# Patient Record
Sex: MALE | Race: WHITE | NOT HISPANIC OR LATINO | Employment: OTHER | URBAN - METROPOLITAN AREA
[De-identification: names, ages, dates, MRNs, and addresses within clinical notes are randomized per-mention and may not be internally consistent; named-entity substitution may affect disease eponyms.]

---

## 2018-09-20 ENCOUNTER — CONVERSION ENCOUNTER (OUTPATIENT)
Dept: SURGERY | Facility: CLINIC | Age: 68
End: 2018-09-20

## 2018-09-20 LAB
ALBUMIN SERPL-MCNC: 3.8 G/DL (ref 3.6–5.1)
ALBUMIN/GLOB SERPL: ABNORMAL {RATIO} (ref 1–2.1)
ALP SERPL-CCNC: 62 UNITS/L (ref 40–115)
ALT SERPL-CCNC: 13 UNITS/L (ref 9–60)
AST SERPL-CCNC: 20 UNITS/L (ref 10–35)
BILIRUB SERPL-MCNC: 1 MG/DL (ref 0.2–1.2)
BUN SERPL-MCNC: 13 MG/DL (ref 7–25)
BUN/CREAT SERPL: ABNORMAL (ref 6–22)
CALCIUM SERPL-MCNC: 8.9 MG/DL (ref 8.6–10.2)
CHLORIDE SERPL-SCNC: 103 MMOL/L (ref 98–110)
CHOLEST SERPL-MCNC: 186 MG/DL (ref 125–200)
CHOLEST/HDLC SERPL: ABNORMAL {RATIO}
CONV CO2: 28 MMOL/L (ref 21–33)
CONV TOTAL PROTEIN: 6.3 G/DL (ref 6.2–8.3)
CREAT UR-MCNC: 1 MG/DL (ref 0.76–1.46)
GLOBULIN UR ELPH-MCNC: ABNORMAL G/DL (ref 2.1–3.7)
GLUCOSE SERPL-MCNC: 86 MG/DL (ref 65–99)
HDLC SERPL-MCNC: 33 MG/DL
LDLC SERPL CALC-MCNC: 129 MG/DL
POTASSIUM SERPL-SCNC: 4.3 MMOL/L (ref 3.5–5.3)
PSA SERPL-MCNC: 0.3 NG/ML
SODIUM SERPL-SCNC: 139 MMOL/L (ref 135–146)
TRIGL SERPL-MCNC: 119 MG/DL

## 2018-11-05 ENCOUNTER — HOSPITAL ENCOUNTER (OUTPATIENT)
Dept: CARDIOLOGY | Facility: HOSPITAL | Age: 68
Discharge: HOME | End: 2018-11-05
Attending: SURGERY | Admitting: SURGERY

## 2019-08-08 ENCOUNTER — OFFICE VISIT (OUTPATIENT)
Dept: FAMILY MEDICINE CLINIC | Facility: CLINIC | Age: 69
End: 2019-08-08

## 2019-08-08 VITALS
TEMPERATURE: 98.3 F | OXYGEN SATURATION: 96 % | RESPIRATION RATE: 18 BRPM | HEART RATE: 66 BPM | HEIGHT: 69 IN | WEIGHT: 162.6 LBS | DIASTOLIC BLOOD PRESSURE: 68 MMHG | BODY MASS INDEX: 24.08 KG/M2 | SYSTOLIC BLOOD PRESSURE: 112 MMHG

## 2019-08-08 DIAGNOSIS — S43.421A SPRAIN OF RIGHT ROTATOR CUFF CAPSULE, INITIAL ENCOUNTER: ICD-10-CM

## 2019-08-08 DIAGNOSIS — Z04.1 ENCOUNTER FOR EXAMINATION FOLLOWING MOTOR VEHICLE ACCIDENT (MVA): Primary | ICD-10-CM

## 2019-08-08 DIAGNOSIS — T07.XXXA MULTIPLE CONTUSIONS: ICD-10-CM

## 2019-08-08 PROBLEM — Z13.220 ENCOUNTER FOR LIPID SCREENING FOR CARDIOVASCULAR DISEASE: Status: ACTIVE | Noted: 2018-09-13

## 2019-08-08 PROBLEM — I83.11 VARICOSE VEINS OF RIGHT LOWER EXTREMITY WITH INFLAMMATION: Status: ACTIVE | Noted: 2018-09-13

## 2019-08-08 PROBLEM — Z13.6 ENCOUNTER FOR LIPID SCREENING FOR CARDIOVASCULAR DISEASE: Status: ACTIVE | Noted: 2018-09-13

## 2019-08-08 PROBLEM — Z12.5 ENCOUNTER FOR SCREENING FOR MALIGNANT NEOPLASM OF PROSTATE: Status: ACTIVE | Noted: 2018-09-13

## 2019-08-08 PROBLEM — R03.0 ELEVATED BLOOD-PRESSURE READING WITHOUT DIAGNOSIS OF HYPERTENSION: Status: ACTIVE | Noted: 2018-09-13

## 2019-08-08 PROCEDURE — 99213 OFFICE O/P EST LOW 20 MIN: CPT | Performed by: FAMILY MEDICINE
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
## 2019-09-12 ENCOUNTER — OFFICE VISIT (OUTPATIENT)
Dept: FAMILY MEDICINE CLINIC | Facility: CLINIC | Age: 69
End: 2019-09-12

## 2019-09-12 VITALS
BODY MASS INDEX: 23.58 KG/M2 | OXYGEN SATURATION: 95 % | DIASTOLIC BLOOD PRESSURE: 73 MMHG | HEART RATE: 81 BPM | SYSTOLIC BLOOD PRESSURE: 115 MMHG | RESPIRATION RATE: 18 BRPM | TEMPERATURE: 98.4 F | HEIGHT: 69 IN | WEIGHT: 159.2 LBS

## 2019-09-12 DIAGNOSIS — M25.611 DECREASED RIGHT SHOULDER RANGE OF MOTION: ICD-10-CM

## 2019-09-12 DIAGNOSIS — R22.31 ARM MASS, RIGHT: ICD-10-CM

## 2019-09-12 DIAGNOSIS — I49.9 CARDIAC ARRHYTHMIA, UNSPECIFIED CARDIAC ARRHYTHMIA TYPE: Primary | ICD-10-CM

## 2019-09-12 DIAGNOSIS — S29.8XXD BLUNT TRAUMA TO CHEST, SUBSEQUENT ENCOUNTER: ICD-10-CM

## 2019-09-12 DIAGNOSIS — S43.421A SPRAIN OF RIGHT ROTATOR CUFF CAPSULE, INITIAL ENCOUNTER: ICD-10-CM

## 2019-09-12 PROCEDURE — 99213 OFFICE O/P EST LOW 20 MIN: CPT | Performed by: FAMILY MEDICINE

## 2019-09-25 DIAGNOSIS — S43.421A SPRAIN OF RIGHT ROTATOR CUFF CAPSULE, INITIAL ENCOUNTER: ICD-10-CM

## 2019-09-25 DIAGNOSIS — M25.511 CHRONIC RIGHT SHOULDER PAIN: ICD-10-CM

## 2019-09-25 DIAGNOSIS — G89.29 CHRONIC RIGHT SHOULDER PAIN: ICD-10-CM

## 2019-09-25 DIAGNOSIS — M25.611 DECREASED RIGHT SHOULDER RANGE OF MOTION: ICD-10-CM

## 2019-09-30 ENCOUNTER — TELEPHONE (OUTPATIENT)
Dept: FAMILY MEDICINE CLINIC | Facility: CLINIC | Age: 69
End: 2019-09-30

## 2019-09-30 DIAGNOSIS — M75.100 TEAR OF ROTATOR CUFF, UNSPECIFIED LATERALITY, UNSPECIFIED TEAR EXTENT: Primary | ICD-10-CM

## 2019-09-30 NOTE — TELEPHONE ENCOUNTER
----- Message from Amber Stuart DO sent at 9/29/2019  8:20 AM EDT -----  Shoulder joint shows tearing of mult tendons and rec seeing Dr. Elias to see if he can offer any sx tx option

## 2019-10-16 ENCOUNTER — OFFICE VISIT (OUTPATIENT)
Dept: ORTHOPEDIC SURGERY | Facility: CLINIC | Age: 69
End: 2019-10-16

## 2019-10-16 ENCOUNTER — PREP FOR SURGERY (OUTPATIENT)
Dept: OTHER | Facility: HOSPITAL | Age: 69
End: 2019-10-16

## 2019-10-16 VITALS
DIASTOLIC BLOOD PRESSURE: 85 MMHG | SYSTOLIC BLOOD PRESSURE: 127 MMHG | HEART RATE: 83 BPM | HEIGHT: 69 IN | WEIGHT: 160 LBS | BODY MASS INDEX: 23.7 KG/M2

## 2019-10-16 DIAGNOSIS — M75.121 COMPLETE TEAR OF RIGHT ROTATOR CUFF, UNSPECIFIED WHETHER TRAUMATIC: ICD-10-CM

## 2019-10-16 DIAGNOSIS — R79.9 ABNORMAL FINDING OF BLOOD CHEMISTRY, UNSPECIFIED: ICD-10-CM

## 2019-10-16 DIAGNOSIS — M19.011 OSTEOARTHRITIS OF RIGHT GLENOHUMERAL JOINT: Primary | ICD-10-CM

## 2019-10-16 DIAGNOSIS — M25.511 ACUTE PAIN OF RIGHT SHOULDER: Primary | ICD-10-CM

## 2019-10-16 DIAGNOSIS — R79.1 ABNORMAL COAGULATION PROFILE: ICD-10-CM

## 2019-10-16 DIAGNOSIS — M19.011 OSTEOARTHRITIS OF RIGHT GLENOHUMERAL JOINT: ICD-10-CM

## 2019-10-16 PROCEDURE — 99203 OFFICE O/P NEW LOW 30 MIN: CPT | Performed by: ORTHOPAEDIC SURGERY

## 2019-10-16 RX ORDER — MELOXICAM 15 MG/1
15 TABLET ORAL ONCE
Status: CANCELLED | OUTPATIENT
Start: 2019-10-16 | End: 2019-10-16

## 2019-10-16 RX ORDER — PREGABALIN 75 MG/1
150 CAPSULE ORAL ONCE
Status: CANCELLED | OUTPATIENT
Start: 2019-10-16 | End: 2019-10-16

## 2019-10-16 RX ORDER — OXYCODONE HCL 10 MG/1
10 TABLET, FILM COATED, EXTENDED RELEASE ORAL ONCE
Status: CANCELLED | OUTPATIENT
Start: 2019-10-16 | End: 2019-10-16
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
## 2019-10-28 ENCOUNTER — HOSPITAL ENCOUNTER (OUTPATIENT)
Dept: GENERAL RADIOLOGY | Facility: HOSPITAL | Age: 69
Discharge: HOME | End: 2019-10-28
Admitting: ORTHOPAEDIC SURGERY

## 2019-10-28 ENCOUNTER — APPOINTMENT (OUTPATIENT)
Dept: PREADMISSION TESTING | Facility: HOSPITAL | Age: 69
End: 2019-10-28

## 2019-10-28 VITALS
SYSTOLIC BLOOD PRESSURE: 127 MMHG | DIASTOLIC BLOOD PRESSURE: 80 MMHG | HEIGHT: 69 IN | HEART RATE: 77 BPM | OXYGEN SATURATION: 97 % | BODY MASS INDEX: 23.85 KG/M2 | WEIGHT: 161 LBS

## 2019-10-28 DIAGNOSIS — M19.011 OSTEOARTHRITIS OF RIGHT GLENOHUMERAL JOINT: ICD-10-CM

## 2019-10-28 DIAGNOSIS — R79.1 ABNORMAL COAGULATION PROFILE: ICD-10-CM

## 2019-10-28 DIAGNOSIS — R79.9 ABNORMAL FINDING OF BLOOD CHEMISTRY, UNSPECIFIED: ICD-10-CM

## 2019-10-28 LAB
ABO GROUP BLD: NORMAL
ANION GAP SERPL CALCULATED.3IONS-SCNC: 10 MMOL/L (ref 5–15)
APTT PPP: 27.1 SECONDS (ref 24–31)
BASOPHILS # BLD AUTO: 0.1 10*3/MM3 (ref 0–0.2)
BASOPHILS NFR BLD AUTO: 0.9 % (ref 0–1.5)
BLD GP AB SCN SERPL QL: NEGATIVE
BUN BLD-MCNC: 14 MG/DL (ref 8–23)
BUN/CREAT SERPL: 15.2 (ref 7–25)
CALCIUM SPEC-SCNC: 9.5 MG/DL (ref 8.6–10.5)
CHLORIDE SERPL-SCNC: 100 MMOL/L (ref 98–107)
CO2 SERPL-SCNC: 30 MMOL/L (ref 22–29)
CREAT BLD-MCNC: 0.92 MG/DL (ref 0.76–1.27)
DEPRECATED RDW RBC AUTO: 45.9 FL (ref 37–54)
EOSINOPHIL # BLD AUTO: 0.3 10*3/MM3 (ref 0–0.4)
EOSINOPHIL NFR BLD AUTO: 4.4 % (ref 0.3–6.2)
ERYTHROCYTE [DISTWIDTH] IN BLOOD BY AUTOMATED COUNT: 14.1 % (ref 12.3–15.4)
GFR SERPL CREATININE-BSD FRML MDRD: 82 ML/MIN/1.73
GLUCOSE BLD-MCNC: 94 MG/DL (ref 65–99)
HBA1C MFR BLD: 5.1 % (ref 3.5–5.6)
HCT VFR BLD AUTO: 44.5 % (ref 37.5–51)
HGB BLD-MCNC: 15.3 G/DL (ref 13–17.7)
INR PPP: 0.98 (ref 0.9–1.1)
LYMPHOCYTES # BLD AUTO: 2 10*3/MM3 (ref 0.7–3.1)
LYMPHOCYTES NFR BLD AUTO: 31.3 % (ref 19.6–45.3)
MCH RBC QN AUTO: 32.1 PG (ref 26.6–33)
MCHC RBC AUTO-ENTMCNC: 34.4 G/DL (ref 31.5–35.7)
MCV RBC AUTO: 93.4 FL (ref 79–97)
MONOCYTES # BLD AUTO: 0.4 10*3/MM3 (ref 0.1–0.9)
MONOCYTES NFR BLD AUTO: 5.8 % (ref 5–12)
NEUTROPHILS # BLD AUTO: 3.7 10*3/MM3 (ref 1.7–7)
NEUTROPHILS NFR BLD AUTO: 57.6 % (ref 42.7–76)
NRBC BLD AUTO-RTO: 0 /100 WBC (ref 0–0.2)
PLATELET # BLD AUTO: 229 10*3/MM3 (ref 140–450)
PMV BLD AUTO: 6.7 FL (ref 6–12)
POTASSIUM BLD-SCNC: 4.4 MMOL/L (ref 3.5–5.2)
PROTHROMBIN TIME: 10.3 SECONDS (ref 9.6–11.7)
RBC # BLD AUTO: 4.76 10*6/MM3 (ref 4.14–5.8)
RH BLD: POSITIVE
SODIUM BLD-SCNC: 140 MMOL/L (ref 136–145)
T&S EXPIRATION DATE: NORMAL
WBC NRBC COR # BLD: 6.4 10*3/MM3 (ref 3.4–10.8)

## 2019-10-28 PROCEDURE — 86900 BLOOD TYPING SEROLOGIC ABO: CPT | Performed by: ORTHOPAEDIC SURGERY

## 2019-10-28 PROCEDURE — 86900 BLOOD TYPING SEROLOGIC ABO: CPT

## 2019-10-28 PROCEDURE — 93005 ELECTROCARDIOGRAM TRACING: CPT

## 2019-10-28 PROCEDURE — 36415 COLL VENOUS BLD VENIPUNCTURE: CPT

## 2019-10-28 PROCEDURE — 86901 BLOOD TYPING SEROLOGIC RH(D): CPT | Performed by: ORTHOPAEDIC SURGERY

## 2019-10-28 PROCEDURE — 93010 ELECTROCARDIOGRAM REPORT: CPT | Performed by: INTERNAL MEDICINE

## 2019-10-28 PROCEDURE — 80048 BASIC METABOLIC PNL TOTAL CA: CPT | Performed by: ORTHOPAEDIC SURGERY

## 2019-10-28 PROCEDURE — 86850 RBC ANTIBODY SCREEN: CPT | Performed by: ORTHOPAEDIC SURGERY

## 2019-10-28 PROCEDURE — 71046 X-RAY EXAM CHEST 2 VIEWS: CPT

## 2019-10-28 PROCEDURE — 86901 BLOOD TYPING SEROLOGIC RH(D): CPT

## 2019-10-28 PROCEDURE — 85730 THROMBOPLASTIN TIME PARTIAL: CPT | Performed by: ORTHOPAEDIC SURGERY

## 2019-10-28 PROCEDURE — 85610 PROTHROMBIN TIME: CPT | Performed by: ORTHOPAEDIC SURGERY

## 2019-10-28 PROCEDURE — 87081 CULTURE SCREEN ONLY: CPT | Performed by: ORTHOPAEDIC SURGERY

## 2019-10-28 PROCEDURE — 83036 HEMOGLOBIN GLYCOSYLATED A1C: CPT | Performed by: ORTHOPAEDIC SURGERY

## 2019-10-28 PROCEDURE — 85025 COMPLETE CBC W/AUTO DIFF WBC: CPT | Performed by: ORTHOPAEDIC SURGERY

## 2019-10-28 RX ORDER — IBUPROFEN 400 MG/1
400 TABLET ORAL EVERY 6 HOURS PRN
COMMUNITY
End: 2023-03-31

## 2019-10-29 LAB — MRSA SPEC QL CULT: NORMAL

## 2019-11-04 ENCOUNTER — ANESTHESIA EVENT (OUTPATIENT)
Dept: PERIOP | Facility: HOSPITAL | Age: 69
End: 2019-11-04

## 2019-11-05 ENCOUNTER — APPOINTMENT (OUTPATIENT)
Dept: GENERAL RADIOLOGY | Facility: HOSPITAL | Age: 69
End: 2019-11-05

## 2019-11-05 ENCOUNTER — HOSPITAL ENCOUNTER (INPATIENT)
Facility: HOSPITAL | Age: 69
LOS: 1 days | Discharge: HOME HEALTH | End: 2019-11-06
Attending: ORTHOPAEDIC SURGERY | Admitting: ORTHOPAEDIC SURGERY

## 2019-11-05 ENCOUNTER — ANESTHESIA (OUTPATIENT)
Dept: PERIOP | Facility: HOSPITAL | Age: 69
End: 2019-11-05

## 2019-11-05 DIAGNOSIS — M19.011 OSTEOARTHRITIS OF RIGHT GLENOHUMERAL JOINT: Primary | ICD-10-CM

## 2019-11-05 PROBLEM — M19.019 DJD OF SHOULDER: Status: ACTIVE | Noted: 2019-11-05

## 2019-11-05 PROBLEM — Z96.611 STATUS POST REVERSE ARTHROPLASTY OF RIGHT SHOULDER: Status: ACTIVE | Noted: 2019-11-05

## 2019-11-05 PROCEDURE — C1776 JOINT DEVICE (IMPLANTABLE): HCPCS | Performed by: ORTHOPAEDIC SURGERY

## 2019-11-05 PROCEDURE — 25010000002 PROPOFOL 10 MG/ML EMULSION: Performed by: ANESTHESIOLOGY

## 2019-11-05 PROCEDURE — 25010000002 CEFTRIAXONE PER 250 MG: Performed by: ORTHOPAEDIC SURGERY

## 2019-11-05 PROCEDURE — C1713 ANCHOR/SCREW BN/BN,TIS/BN: HCPCS | Performed by: ORTHOPAEDIC SURGERY

## 2019-11-05 PROCEDURE — 25010000002 CEFAZOLIN PER 500 MG: Performed by: ORTHOPAEDIC SURGERY

## 2019-11-05 PROCEDURE — 25010000002 VANCOMYCIN 1 G RECONSTITUTED SOLUTION 1 EACH VIAL: Performed by: ORTHOPAEDIC SURGERY

## 2019-11-05 PROCEDURE — 25010000002 ONDANSETRON PER 1 MG: Performed by: ANESTHESIOLOGY

## 2019-11-05 PROCEDURE — C9290 INJ, BUPIVACAINE LIPOSOME: HCPCS | Performed by: ANESTHESIOLOGY

## 2019-11-05 PROCEDURE — 25010000002 DEXAMETHASONE PER 1 MG: Performed by: ANESTHESIOLOGY

## 2019-11-05 PROCEDURE — 25010000002 MIDAZOLAM PER 1 MG: Performed by: ANESTHESIOLOGY

## 2019-11-05 PROCEDURE — 25010000002 FENTANYL CITRATE (PF) 100 MCG/2ML SOLUTION: Performed by: ANESTHESIOLOGY

## 2019-11-05 PROCEDURE — 0RRJ00Z REPLACEMENT OF RIGHT SHOULDER JOINT WITH REVERSE BALL AND SOCKET SYNTHETIC SUBSTITUTE, OPEN APPROACH: ICD-10-PCS | Performed by: ORTHOPAEDIC SURGERY

## 2019-11-05 PROCEDURE — 73030 X-RAY EXAM OF SHOULDER: CPT

## 2019-11-05 PROCEDURE — 99251 PR INITL INPATIENT CONSULT NEW/ESTAB PT 20 MIN: CPT | Performed by: NURSE PRACTITIONER

## 2019-11-05 PROCEDURE — 23472 RECONSTRUCT SHOULDER JOINT: CPT | Performed by: ORTHOPAEDIC SURGERY

## 2019-11-05 PROCEDURE — 25010000003 BUPIVACAINE LIPOSOME 1.3 % SUSPENSION: Performed by: ANESTHESIOLOGY

## 2019-11-05 DEVICE — INVERSE/REVERSE SCREW SYSTEM, 4.5-33
Type: IMPLANTABLE DEVICE | Site: SHOULDER | Status: FUNCTIONAL
Brand: INVERSE/REVERSE

## 2019-11-05 DEVICE — GLENSPHR TRABECULARMETAL REV 40MM: Type: IMPLANTABLE DEVICE | Site: SHOULDER | Status: FUNCTIONAL

## 2019-11-05 DEVICE — KT SHLDR DRL PIN SPG: Type: IMPLANTABLE DEVICE | Site: SHOULDER | Status: FUNCTIONAL

## 2019-11-05 DEVICE — SUT NONABS MAXBRAID/PE NMBR2 HC5 38IN BLU 900334: Type: IMPLANTABLE DEVICE | Site: SHOULDER | Status: FUNCTIONAL

## 2019-11-05 DEVICE — STEM HUM/SHLDR TRABECULARMETAL REV 14X130MM: Type: IMPLANTABLE DEVICE | Site: SHOULDER | Status: FUNCTIONAL

## 2019-11-05 DEVICE — LINER HUM/SHLDR TRABECULARMETAL REV POLY 60DEG 40MM PLS0: Type: IMPLANTABLE DEVICE | Site: SHOULDER | Status: FUNCTIONAL

## 2019-11-05 DEVICE — SPACR HUM TRABECULAR REV PLS12MM: Type: IMPLANTABLE DEVICE | Site: SHOULDER | Status: FUNCTIONAL

## 2019-11-05 DEVICE — BASEPLT GLEN TRABECULARMETAL REV 15MM: Type: IMPLANTABLE DEVICE | Site: SHOULDER | Status: FUNCTIONAL

## 2019-11-05 DEVICE — TOTL SHLDER REV: Type: IMPLANTABLE DEVICE | Site: SHOULDER | Status: FUNCTIONAL

## 2019-11-05 RX ORDER — PROMETHAZINE HYDROCHLORIDE 25 MG/ML
6.25 INJECTION, SOLUTION INTRAMUSCULAR; INTRAVENOUS ONCE AS NEEDED
Status: DISCONTINUED | OUTPATIENT
Start: 2019-11-05 | End: 2019-11-05 | Stop reason: HOSPADM

## 2019-11-05 RX ORDER — ONDANSETRON 2 MG/ML
INJECTION INTRAMUSCULAR; INTRAVENOUS AS NEEDED
Status: DISCONTINUED | OUTPATIENT
Start: 2019-11-05 | End: 2019-11-05 | Stop reason: SURG

## 2019-11-05 RX ORDER — ROCURONIUM BROMIDE 10 MG/ML
INJECTION, SOLUTION INTRAVENOUS AS NEEDED
Status: DISCONTINUED | OUTPATIENT
Start: 2019-11-05 | End: 2019-11-05 | Stop reason: SURG

## 2019-11-05 RX ORDER — SODIUM CHLORIDE, SODIUM LACTATE, POTASSIUM CHLORIDE, CALCIUM CHLORIDE 600; 310; 30; 20 MG/100ML; MG/100ML; MG/100ML; MG/100ML
9 INJECTION, SOLUTION INTRAVENOUS CONTINUOUS PRN
Status: DISCONTINUED | OUTPATIENT
Start: 2019-11-05 | End: 2019-11-06 | Stop reason: HOSPADM

## 2019-11-05 RX ORDER — MIDAZOLAM HYDROCHLORIDE 1 MG/ML
INJECTION INTRAMUSCULAR; INTRAVENOUS
Status: COMPLETED | OUTPATIENT
Start: 2019-11-05 | End: 2019-11-05

## 2019-11-05 RX ORDER — NEOSTIGMINE METHYLSULFATE 5 MG/5 ML
SYRINGE (ML) INTRAVENOUS AS NEEDED
Status: DISCONTINUED | OUTPATIENT
Start: 2019-11-05 | End: 2019-11-05 | Stop reason: SURG

## 2019-11-05 RX ORDER — HYDROCODONE BITARTRATE AND ACETAMINOPHEN 5; 325 MG/1; MG/1
1 TABLET ORAL ONCE AS NEEDED
Status: DISCONTINUED | OUTPATIENT
Start: 2019-11-05 | End: 2019-11-05 | Stop reason: HOSPADM

## 2019-11-05 RX ORDER — SODIUM CHLORIDE 0.9 % (FLUSH) 0.9 %
3 SYRINGE (ML) INJECTION EVERY 12 HOURS SCHEDULED
Status: DISCONTINUED | OUTPATIENT
Start: 2019-11-05 | End: 2019-11-05 | Stop reason: HOSPADM

## 2019-11-05 RX ORDER — DEXAMETHASONE SODIUM PHOSPHATE 4 MG/ML
INJECTION, SOLUTION INTRA-ARTICULAR; INTRALESIONAL; INTRAMUSCULAR; INTRAVENOUS; SOFT TISSUE
Status: COMPLETED | OUTPATIENT
Start: 2019-11-05 | End: 2019-11-05

## 2019-11-05 RX ORDER — OXYCODONE HYDROCHLORIDE 5 MG/1
10 TABLET ORAL EVERY 4 HOURS PRN
Status: DISCONTINUED | OUTPATIENT
Start: 2019-11-05 | End: 2019-11-06 | Stop reason: HOSPADM

## 2019-11-05 RX ORDER — SODIUM CHLORIDE 0.9 % (FLUSH) 0.9 %
3-10 SYRINGE (ML) INJECTION AS NEEDED
Status: DISCONTINUED | OUTPATIENT
Start: 2019-11-05 | End: 2019-11-05 | Stop reason: HOSPADM

## 2019-11-05 RX ORDER — IPRATROPIUM BROMIDE AND ALBUTEROL SULFATE 2.5; .5 MG/3ML; MG/3ML
3 SOLUTION RESPIRATORY (INHALATION) ONCE AS NEEDED
Status: DISCONTINUED | OUTPATIENT
Start: 2019-11-05 | End: 2019-11-05 | Stop reason: HOSPADM

## 2019-11-05 RX ORDER — ONDANSETRON 4 MG/1
4 TABLET, FILM COATED ORAL EVERY 6 HOURS PRN
Status: DISCONTINUED | OUTPATIENT
Start: 2019-11-05 | End: 2019-11-06 | Stop reason: HOSPADM

## 2019-11-05 RX ORDER — HYDROMORPHONE HCL 110MG/55ML
0.5 PATIENT CONTROLLED ANALGESIA SYRINGE INTRAVENOUS
Status: DISCONTINUED | OUTPATIENT
Start: 2019-11-05 | End: 2019-11-05 | Stop reason: HOSPADM

## 2019-11-05 RX ORDER — MELOXICAM 15 MG/1
15 TABLET ORAL DAILY
Status: DISCONTINUED | OUTPATIENT
Start: 2019-11-06 | End: 2019-11-06 | Stop reason: HOSPADM

## 2019-11-05 RX ORDER — EPHEDRINE SULFATE 50 MG/ML
5 INJECTION, SOLUTION INTRAVENOUS ONCE AS NEEDED
Status: DISCONTINUED | OUTPATIENT
Start: 2019-11-05 | End: 2019-11-05 | Stop reason: HOSPADM

## 2019-11-05 RX ORDER — ATROPINE SULFATE 1 MG/ML
0.5 INJECTION, SOLUTION INTRAMUSCULAR; INTRAVENOUS; SUBCUTANEOUS ONCE AS NEEDED
Status: DISCONTINUED | OUTPATIENT
Start: 2019-11-05 | End: 2019-11-05 | Stop reason: HOSPADM

## 2019-11-05 RX ORDER — ASPIRIN 325 MG
325 TABLET, DELAYED RELEASE (ENTERIC COATED) ORAL EVERY 12 HOURS SCHEDULED
Status: DISCONTINUED | OUTPATIENT
Start: 2019-11-05 | End: 2019-11-06 | Stop reason: HOSPADM

## 2019-11-05 RX ORDER — FENTANYL CITRATE 50 UG/ML
INJECTION, SOLUTION INTRAMUSCULAR; INTRAVENOUS
Status: COMPLETED | OUTPATIENT
Start: 2019-11-05 | End: 2019-11-05

## 2019-11-05 RX ORDER — MELOXICAM 15 MG/1
15 TABLET ORAL ONCE
Status: COMPLETED | OUTPATIENT
Start: 2019-11-05 | End: 2019-11-05

## 2019-11-05 RX ORDER — HYDROCODONE BITARTRATE AND ACETAMINOPHEN 10; 325 MG/1; MG/1
1 TABLET ORAL EVERY 4 HOURS PRN
Status: DISCONTINUED | OUTPATIENT
Start: 2019-11-05 | End: 2019-11-05 | Stop reason: HOSPADM

## 2019-11-05 RX ORDER — ACETAMINOPHEN 650 MG/1
650 SUPPOSITORY RECTAL EVERY 4 HOURS PRN
Status: DISCONTINUED | OUTPATIENT
Start: 2019-11-05 | End: 2019-11-06 | Stop reason: HOSPADM

## 2019-11-05 RX ORDER — DIPHENHYDRAMINE HCL 25 MG
25 TABLET ORAL EVERY 6 HOURS PRN
Status: DISCONTINUED | OUTPATIENT
Start: 2019-11-05 | End: 2019-11-06 | Stop reason: HOSPADM

## 2019-11-05 RX ORDER — BUPIVACAINE HYDROCHLORIDE 5 MG/ML
INJECTION, SOLUTION EPIDURAL; INTRACAUDAL
Status: COMPLETED | OUTPATIENT
Start: 2019-11-05 | End: 2019-11-05

## 2019-11-05 RX ORDER — NALOXONE HCL 0.4 MG/ML
0.4 VIAL (ML) INJECTION
Status: DISCONTINUED | OUTPATIENT
Start: 2019-11-05 | End: 2019-11-06 | Stop reason: HOSPADM

## 2019-11-05 RX ORDER — MORPHINE SULFATE 4 MG/ML
2 INJECTION, SOLUTION INTRAMUSCULAR; INTRAVENOUS EVERY 4 HOURS PRN
Status: DISCONTINUED | OUTPATIENT
Start: 2019-11-05 | End: 2019-11-06 | Stop reason: HOSPADM

## 2019-11-05 RX ORDER — ONDANSETRON 2 MG/ML
4 INJECTION INTRAMUSCULAR; INTRAVENOUS ONCE AS NEEDED
Status: DISCONTINUED | OUTPATIENT
Start: 2019-11-05 | End: 2019-11-05 | Stop reason: HOSPADM

## 2019-11-05 RX ORDER — GLYCOPYRROLATE 1 MG/5 ML
SYRINGE (ML) INTRAVENOUS AS NEEDED
Status: DISCONTINUED | OUTPATIENT
Start: 2019-11-05 | End: 2019-11-05 | Stop reason: SURG

## 2019-11-05 RX ORDER — OXYCODONE HCL 20 MG/1
20 TABLET, FILM COATED, EXTENDED RELEASE ORAL EVERY 12 HOURS SCHEDULED
Status: DISCONTINUED | OUTPATIENT
Start: 2019-11-05 | End: 2019-11-06 | Stop reason: HOSPADM

## 2019-11-05 RX ORDER — ONDANSETRON 2 MG/ML
4 INJECTION INTRAMUSCULAR; INTRAVENOUS EVERY 6 HOURS PRN
Status: DISCONTINUED | OUTPATIENT
Start: 2019-11-05 | End: 2019-11-06 | Stop reason: HOSPADM

## 2019-11-05 RX ORDER — DIPHENHYDRAMINE HCL 25 MG
25 TABLET ORAL NIGHTLY PRN
Status: DISCONTINUED | OUTPATIENT
Start: 2019-11-05 | End: 2019-11-06 | Stop reason: HOSPADM

## 2019-11-05 RX ORDER — GLYCOPYRROLATE 0.2 MG/ML
INJECTION INTRAMUSCULAR; INTRAVENOUS AS NEEDED
Status: DISCONTINUED | OUTPATIENT
Start: 2019-11-05 | End: 2019-11-05 | Stop reason: SURG

## 2019-11-05 RX ORDER — PHENYLEPHRINE HCL IN 0.9% NACL 0.5 MG/5ML
SYRINGE (ML) INTRAVENOUS AS NEEDED
Status: DISCONTINUED | OUTPATIENT
Start: 2019-11-05 | End: 2019-11-05 | Stop reason: SURG

## 2019-11-05 RX ORDER — DIPHENHYDRAMINE HYDROCHLORIDE 50 MG/ML
25 INJECTION INTRAMUSCULAR; INTRAVENOUS NIGHTLY PRN
Status: DISCONTINUED | OUTPATIENT
Start: 2019-11-05 | End: 2019-11-06 | Stop reason: HOSPADM

## 2019-11-05 RX ORDER — DEXAMETHASONE SODIUM PHOSPHATE 4 MG/ML
INJECTION, SOLUTION INTRA-ARTICULAR; INTRALESIONAL; INTRAMUSCULAR; INTRAVENOUS; SOFT TISSUE AS NEEDED
Status: DISCONTINUED | OUTPATIENT
Start: 2019-11-05 | End: 2019-11-05 | Stop reason: SURG

## 2019-11-05 RX ORDER — HYDRALAZINE HYDROCHLORIDE 20 MG/ML
5 INJECTION INTRAMUSCULAR; INTRAVENOUS
Status: DISCONTINUED | OUTPATIENT
Start: 2019-11-05 | End: 2019-11-05 | Stop reason: HOSPADM

## 2019-11-05 RX ORDER — PROMETHAZINE HYDROCHLORIDE 25 MG/1
25 SUPPOSITORY RECTAL ONCE AS NEEDED
Status: DISCONTINUED | OUTPATIENT
Start: 2019-11-05 | End: 2019-11-05 | Stop reason: HOSPADM

## 2019-11-05 RX ORDER — LABETALOL HYDROCHLORIDE 5 MG/ML
5 INJECTION, SOLUTION INTRAVENOUS
Status: DISCONTINUED | OUTPATIENT
Start: 2019-11-05 | End: 2019-11-05 | Stop reason: HOSPADM

## 2019-11-05 RX ORDER — PREGABALIN 75 MG/1
150 CAPSULE ORAL ONCE
Status: COMPLETED | OUTPATIENT
Start: 2019-11-05 | End: 2019-11-05

## 2019-11-05 RX ORDER — TRAMADOL HYDROCHLORIDE 50 MG/1
50 TABLET ORAL EVERY 6 HOURS PRN
Status: DISCONTINUED | OUTPATIENT
Start: 2019-11-05 | End: 2019-11-06 | Stop reason: HOSPADM

## 2019-11-05 RX ORDER — ACETAMINOPHEN 325 MG/1
650 TABLET ORAL EVERY 4 HOURS PRN
Status: DISCONTINUED | OUTPATIENT
Start: 2019-11-05 | End: 2019-11-06 | Stop reason: HOSPADM

## 2019-11-05 RX ORDER — HYDROMORPHONE HCL 110MG/55ML
0.25 PATIENT CONTROLLED ANALGESIA SYRINGE INTRAVENOUS
Status: DISCONTINUED | OUTPATIENT
Start: 2019-11-05 | End: 2019-11-05 | Stop reason: HOSPADM

## 2019-11-05 RX ORDER — BISACODYL 10 MG
10 SUPPOSITORY, RECTAL RECTAL DAILY PRN
Status: DISCONTINUED | OUTPATIENT
Start: 2019-11-05 | End: 2019-11-06 | Stop reason: HOSPADM

## 2019-11-05 RX ORDER — SODIUM CHLORIDE 9 MG/ML
75 INJECTION, SOLUTION INTRAVENOUS CONTINUOUS
Status: DISCONTINUED | OUTPATIENT
Start: 2019-11-05 | End: 2019-11-06 | Stop reason: HOSPADM

## 2019-11-05 RX ORDER — MEPERIDINE HYDROCHLORIDE 25 MG/ML
12.5 INJECTION INTRAMUSCULAR; INTRAVENOUS; SUBCUTANEOUS
Status: DISCONTINUED | OUTPATIENT
Start: 2019-11-05 | End: 2019-11-05 | Stop reason: HOSPADM

## 2019-11-05 RX ORDER — PROMETHAZINE HYDROCHLORIDE 25 MG/1
25 TABLET ORAL ONCE AS NEEDED
Status: DISCONTINUED | OUTPATIENT
Start: 2019-11-05 | End: 2019-11-05 | Stop reason: HOSPADM

## 2019-11-05 RX ORDER — OXYCODONE HCL 10 MG/1
10 TABLET, FILM COATED, EXTENDED RELEASE ORAL ONCE
Status: COMPLETED | OUTPATIENT
Start: 2019-11-05 | End: 2019-11-05

## 2019-11-05 RX ORDER — PROPOFOL 10 MG/ML
VIAL (ML) INTRAVENOUS AS NEEDED
Status: DISCONTINUED | OUTPATIENT
Start: 2019-11-05 | End: 2019-11-05 | Stop reason: SURG

## 2019-11-05 RX ORDER — DIPHENHYDRAMINE HYDROCHLORIDE 50 MG/ML
12.5 INJECTION INTRAMUSCULAR; INTRAVENOUS
Status: DISCONTINUED | OUTPATIENT
Start: 2019-11-05 | End: 2019-11-05 | Stop reason: HOSPADM

## 2019-11-05 RX ADMIN — PHENYLEPHRINE HYDROCHLORIDE 200 MCG: 10 INJECTION INTRAVENOUS at 15:19

## 2019-11-05 RX ADMIN — FENTANYL CITRATE 50 MCG: 50 INJECTION, SOLUTION INTRAMUSCULAR; INTRAVENOUS at 14:45

## 2019-11-05 RX ADMIN — ONDANSETRON 4 MG: 2 INJECTION INTRAMUSCULAR; INTRAVENOUS at 16:10

## 2019-11-05 RX ADMIN — BUPIVACAINE 10 ML: 13.3 INJECTION, SUSPENSION, LIPOSOMAL INFILTRATION at 14:15

## 2019-11-05 RX ADMIN — MELOXICAM 15 MG: 15 TABLET ORAL at 11:11

## 2019-11-05 RX ADMIN — PHENYLEPHRINE HYDROCHLORIDE 200 MCG: 10 INJECTION INTRAVENOUS at 14:58

## 2019-11-05 RX ADMIN — DEXAMETHASONE SODIUM PHOSPHATE 4 MG: 4 INJECTION, SOLUTION INTRAMUSCULAR; INTRAVENOUS at 15:02

## 2019-11-05 RX ADMIN — PROPOFOL 150 MG: 10 INJECTION, EMULSION INTRAVENOUS at 14:50

## 2019-11-05 RX ADMIN — PREGABALIN 150 MG: 75 CAPSULE ORAL at 11:12

## 2019-11-05 RX ADMIN — PHENYLEPHRINE HYDROCHLORIDE 200 MCG: 10 INJECTION INTRAVENOUS at 15:33

## 2019-11-05 RX ADMIN — OXYCODONE HYDROCHLORIDE 20 MG: 20 TABLET, FILM COATED, EXTENDED RELEASE ORAL at 22:00

## 2019-11-05 RX ADMIN — PHENYLEPHRINE HYDROCHLORIDE 200 MCG: 10 INJECTION INTRAVENOUS at 15:59

## 2019-11-05 RX ADMIN — ROCURONIUM BROMIDE 50 MG: 10 INJECTION, SOLUTION INTRAVENOUS at 14:50

## 2019-11-05 RX ADMIN — PHENYLEPHRINE HYDROCHLORIDE 200 MCG: 10 INJECTION INTRAVENOUS at 15:11

## 2019-11-05 RX ADMIN — MIDAZOLAM 2 MG: 1 INJECTION INTRAMUSCULAR; INTRAVENOUS at 14:15

## 2019-11-05 RX ADMIN — SODIUM CHLORIDE 75 ML/HR: 0.9 INJECTION, SOLUTION INTRAVENOUS at 20:33

## 2019-11-05 RX ADMIN — CEFAZOLIN SODIUM 2 G: 10 INJECTION, POWDER, FOR SOLUTION INTRAVENOUS at 22:00

## 2019-11-05 RX ADMIN — GLYCOPYRROLATE 0.2 MG: 0.2 INJECTION, SOLUTION INTRAMUSCULAR; INTRAVENOUS at 15:29

## 2019-11-05 RX ADMIN — PHENYLEPHRINE HYDROCHLORIDE 200 MCG: 10 INJECTION INTRAVENOUS at 15:26

## 2019-11-05 RX ADMIN — PHENYLEPHRINE HYDROCHLORIDE 200 MCG: 10 INJECTION INTRAVENOUS at 15:15

## 2019-11-05 RX ADMIN — Medication 3 MG: at 16:13

## 2019-11-05 RX ADMIN — PHENYLEPHRINE HYDROCHLORIDE 200 MCG: 10 INJECTION INTRAVENOUS at 15:50

## 2019-11-05 RX ADMIN — CEFAZOLIN SODIUM 2 G: 1 INJECTION, POWDER, FOR SOLUTION INTRAMUSCULAR; INTRAVENOUS at 14:57

## 2019-11-05 RX ADMIN — FENTANYL CITRATE 50 MCG: 50 INJECTION, SOLUTION INTRAMUSCULAR; INTRAVENOUS at 14:15

## 2019-11-05 RX ADMIN — PHENYLEPHRINE HYDROCHLORIDE 200 MCG: 10 INJECTION INTRAVENOUS at 15:03

## 2019-11-05 RX ADMIN — SODIUM CHLORIDE, SODIUM LACTATE, POTASSIUM CHLORIDE, AND CALCIUM CHLORIDE 9 ML/HR: 600; 310; 30; 20 INJECTION, SOLUTION INTRAVENOUS at 11:29

## 2019-11-05 RX ADMIN — ASPIRIN 325 MG: 325 TABLET, DELAYED RELEASE ORAL at 21:57

## 2019-11-05 RX ADMIN — Medication 0.6 MG: at 16:13

## 2019-11-05 RX ADMIN — PHENYLEPHRINE HYDROCHLORIDE 200 MCG: 10 INJECTION INTRAVENOUS at 15:40

## 2019-11-05 RX ADMIN — DEXAMETHASONE SODIUM PHOSPHATE 4 MG: 4 INJECTION, SOLUTION INTRAMUSCULAR; INTRAVENOUS at 14:15

## 2019-11-05 RX ADMIN — BUPIVACAINE HYDROCHLORIDE 10 ML: 5 INJECTION, SOLUTION EPIDURAL; INTRACAUDAL; PERINEURAL at 14:15

## 2019-11-05 RX ADMIN — SODIUM CHLORIDE, SODIUM LACTATE, POTASSIUM CHLORIDE, AND CALCIUM CHLORIDE: 600; 310; 30; 20 INJECTION, SOLUTION INTRAVENOUS at 16:25

## 2019-11-05 RX ADMIN — OXYCODONE HYDROCHLORIDE 10 MG: 10 TABLET, FILM COATED, EXTENDED RELEASE ORAL at 11:11

## 2019-11-05 NOTE — ANESTHESIA PROCEDURE NOTES
Airway  Date/Time: 11/5/2019 2:52 PM  Airway not difficult    General Information and Staff    Patient location during procedure: OR  Anesthesiologist: Nirav Vale MD    Indications and Patient Condition  Indications for airway management: airway protection    Preoxygenated: yes  MILS maintained throughout  Mask difficulty assessment: 1 - vent by mask    Final Airway Details  Final airway type: endotracheal airway      Successful airway: ETT  Cuffed: yes   Successful intubation technique: direct laryngoscopy  Endotracheal tube insertion site: oral  Blade: Elizabeth  Blade size: 4  ETT size (mm): 7.5  Cormack-Lehane Classification: grade IIb - view of arytenoids or posterior of glottis only  Placement verified by: chest auscultation and capnometry   Measured from: lips  ETT/EBT  to lips (cm): 20  Number of attempts at approach: 1  Assessment: lips, teeth, and gum same as pre-op and atraumatic intubation

## 2019-11-05 NOTE — ANESTHESIA PREPROCEDURE EVALUATION
Anesthesia Evaluation     Patient summary reviewed and Nursing notes reviewed   no history of anesthetic complications:  NPO Solid Status: > 8 hours  NPO Liquid Status: > 8 hours           Airway   Mallampati: II  TM distance: >3 FB  Neck ROM: full  No difficulty expected  Dental      Pulmonary - normal exam   (+) a smoker Current Abstained day of surgery,   Cardiovascular - negative cardio ROS and normal exam        Neuro/Psych- negative ROS  GI/Hepatic/Renal/Endo - negative ROS     Musculoskeletal     Abdominal  - normal exam   Substance History - negative use     OB/GYN negative ob/gyn ROS         Other   arthritis,                      Anesthesia Plan    ASA 2     general with block     intravenous induction     Anesthetic plan, all risks, benefits, and alternatives have been provided, discussed and informed consent has been obtained with: patient.

## 2019-11-05 NOTE — OP NOTE
TOTAL SHOULDER REVERSE ARTHROPLASTY  Procedure Report    Patient Name:  Jayson Martin  YOB: 1950    Date of Surgery:  11/5/2019     Indications: This is a 69 y.o. male with right shoulder pain.  Imaging demonstrated massive irreparable cuff tear.  Treatment options were discussed.  They desired to proceed with reverse shoulder arthroplasty after discussing the risks including bleeding, scarring, infection, stiffness, nerve damage, tendon damage, artery damage, continued  pain, DVT, loss of life or limb, fracture, dislocation, and a need for further surgery.      Pre-op Diagnosis:   Massive irreparable cuff tear right shoulder with degenerative joint disease       Post-Op Diagnosis Codes:  Same    Procedure/CPT® Codes: 73175    Procedure(s):  TOTAL SHOULDER REVERSE ARTHROPLASTY    Staff: Rosalino Younger certified first assist    Anesthesia: General with Block    Estimated Blood Loss: 100ml    Implants:    Implant Name Type Inv. Item Serial No.  Lot No. LRB No. Used   SUT MAXBRAID 2 HC5 38IN WHT/JOSE [id number] - [id number] Implant SUT MAXBRAID 2 HC5 38IN WHT/JOSE [id number]  [employer] . Right 4   BASEPLT TRABECULAR REV MTL 15MM - [id number] Implant BASEPLT TRABECULAR REV MTL 15MM  [employer] [id number] Right 1   GLENOSPHERE TRABECULAR REV MTL 40MM STRL - [id number] Implant GLENOSPHERE TRABECULAR REV MTL 40MM STRL  [employer] [id number] Right 1   SCRW CANC INV/REV 4.5X33MM - [id number] Implant SCRW CANC INV/REV 4.5X33MM  [employer] [id number] Right 1   SCRW CANC INV/REV 4.5X33MM - [id number] Implant SCRW CANC INV/REV 4.5X33MM  [employer] [id number] Right 1   STEM HUM TRABECULAR REV [id number] - [id number] Implant STEM HUM TRABECULAR REV [id number]  [employer] [id number] Right 1   SPACR HUM TRABECULAR REV [id number] - [id number] Implant SPACR HUM TRABECULAR REV [id number]  [employer] [id number] Right 1   LINER HUM TRABECULAR REV PA 40 PLS0 - [id number] Implant LINER HUM TRABECULAR REV PA 40 PLS0   [employer] [facility] [id number] Right 1       IVF: see anesthesia      Complications: None    Description of Procedure: The patient's operative site was marked in the  preoperative holding area.  They received regional anesthesia and were brought to  the operating room and placed supine on a well-padded operating room table.  General anesthesia was administered.  Antibiotics were dosed.  A timeout was  taken, confirming the correct operative site and procedure.  They were placed in  the beach chair position.  The right shoulder was prepped and draped in the  standard surgical fashion.  SCDs were placed. A deltopectoral incision was marked and injected with local anesthetic.  The skin was opened.  Flaps were raised.  The interval was opened and the cephalic vein was protected.  Adhesions were released from the deltoid.  The circumflex vessels were identified and ligated with suture and cautery.  The rotator interval was opened and a retractor was placed.  The subscapularis was  Tenotomized and the capsule was released down to the 6 o'clock position on the  humeral head protecting the axillary nerve.  A Fukuda retractor was placed and an inferior and anterior capsular release was performed palpating and protecting the axillary  nerve with my finger at all times.  The shoulder was dislocated.  The biceps  was absent as well as the posterior superior cuff.  Reaming was started  behind the biceps groove up to a size 14.  The cut was made in 20  degrees of retroversion and the final two reamers were used to prepare the  proximal humerus.  A trial was placed.  The glenoid was exposed after placing retractors.  The soft tissue was removed circumferentially.  The center point was marked and the glenoid was prepared in standard fashion.  The base plate was placed with good press-fit.  Two screws were placed with good purchase.  The locking caps were  placed.  The glenosphere was placed with good purchase.  The shoulder was  dislocated  and the trial was removed from the canal and irrigated.  The true  stem was placed with good press-fit and trialing demonstrated 12 thickness to offer the best restoration of joint stability, muscle tension and range of motion.  The true polyethylene was placed with similar range of motion and stability.  A 3-minute Betadine wash performed.  The wound was closed in layers with absorbable suture and skin glue.  A sterile dressing and sling were applied.  They were awakened and taken to the recovery room.  There were no complications.  I was present for all portions.  All counts were correct.   Good capillary refill was noted to the hand.      Babak Elias MD     Date: 11/5/2019  Time: 4:14 PM

## 2019-11-05 NOTE — ANESTHESIA PROCEDURE NOTES
Peripheral Block      Patient location during procedure: pre-op  Reason for block: at surgeon's request and post-op pain management  Performed by  Anesthesiologist: Alejandro Byrd MD  Preanesthetic Checklist  Completed: patient identified, site marked, surgical consent, pre-op evaluation, timeout performed, IV checked, risks and benefits discussed and monitors and equipment checked  Prep:  Pt Position: supine  Sterile barriers:mask, gown, gloves and cap  Prep: ChloraPrep  Patient monitoring: blood pressure monitoring, continuous pulse oximetry and EKG  Procedure  Sedation:yes  Performed under: local infiltration  Guidance:ultrasound guided  ULTRASOUND INTERPRETATION. Using ultrasound guidance a gauge needle was placed in close proximity to the brachial plexus nerve, at which point, under ultrasound guidance anesthetic was injected in the area of the nerve and spread of the anesthesia was seen on ultrasound in close proximity thereto.  There were no abnormalities seen on ultrasound; a digital image was taken; and the patient tolerated the procedure with no complications. Images:still images obtained, printed/placed on chart    Laterality:right  Block Type:interscalene  Injection Technique:single-shot  Needle Type:echogenic  Needle Gauge:20 G  Resistance on Injection: none  Sedation medications used: fentaNYL citrate (PF) (SUBLIMAZE) injection, 50 mcg  midazolam (VERSED) injection, 2 mg  Medications Used: dexamethasone (DECADRON) injection, 4 mg  bupivacaine liposome (EXPAREL) injection 1.3%, 10 mL  bupivacaine PF (MARCAINE) injection 0.5%, 10 mL  Med admintered at 11/5/2019 2:15 PM      Medications  Comment:10ml exparel/10ml 0.5%bupiv   No pain paresthesia or heme aspirated.  Arm going numb at conclusion of procedure.  No comp    Post Assessment  Injection Assessment: negative aspiration for heme, no paresthesia on injection and incremental injection  Patient Tolerance:comfortable throughout  block  Complications:no

## 2019-11-05 NOTE — ANESTHESIA POSTPROCEDURE EVALUATION
Patient: Jayson Martin    Procedure Summary     Date:  11/05/19 Room / Location:  [facility] OR  / [facility] MAIN OR    Anesthesia Start:  1445 Anesthesia Stop:  1635    Procedure:  TOTAL SHOULDER REVERSE ARTHROPLASTY (Right Shoulder) Diagnosis:       Osteoarthritis of right glenohumeral joint      (Osteoarthritis of right glenohumeral joint [M19.011])    Surgeon:  Babak Elias MD Provider:  Nirav Vale MD    Anesthesia Type:  general with block ASA Status:  2          Anesthesia Type: general with block  Last vitals  BP   115/63 (11/05/19 1800)   Temp   96.9 °F (36.1 °C) (11/05/19 1635)   Pulse   64 (11/05/19 1800)   Resp   12 (11/05/19 1800)     SpO2   92 % (11/05/19 1800)     Post Anesthesia Care and Evaluation    Patient location during evaluation: PACU  Patient participation: complete - patient participated  Level of consciousness: awake  Pain score: 1 (See nurse's notes for pain score)  Pain management: adequate  Airway patency: patent  Anesthetic complications: No anesthetic complications  PONV Status: none  Cardiovascular status: acceptable  Respiratory status: acceptable  Hydration status: acceptable    Comments: Patient seen and examined postoperatively; vital signs stable; SpO2 greater than or equal to 90%; cardiopulmonary status stable; nausea/vomiting adequately controlled; pain adequately controlled; no apparent anesthesia complications; patient discharged from anesthesia care when discharge criteria were met

## 2019-11-06 VITALS
WEIGHT: 159.39 LBS | TEMPERATURE: 98.4 F | SYSTOLIC BLOOD PRESSURE: 116 MMHG | BODY MASS INDEX: 23.61 KG/M2 | RESPIRATION RATE: 17 BRPM | DIASTOLIC BLOOD PRESSURE: 70 MMHG | OXYGEN SATURATION: 90 % | HEIGHT: 69 IN | HEART RATE: 96 BPM

## 2019-11-06 LAB
ANION GAP SERPL CALCULATED.3IONS-SCNC: 10 MMOL/L (ref 5–15)
BUN BLD-MCNC: 19 MG/DL (ref 8–23)
BUN/CREAT SERPL: 19.6 (ref 7–25)
CALCIUM SPEC-SCNC: 8.5 MG/DL (ref 8.6–10.5)
CHLORIDE SERPL-SCNC: 101 MMOL/L (ref 98–107)
CO2 SERPL-SCNC: 26 MMOL/L (ref 22–29)
CREAT BLD-MCNC: 0.97 MG/DL (ref 0.76–1.27)
GFR SERPL CREATININE-BSD FRML MDRD: 77 ML/MIN/1.73
GLUCOSE BLD-MCNC: 149 MG/DL (ref 65–99)
HCT VFR BLD AUTO: 37.6 % (ref 37.5–51)
HGB BLD-MCNC: 12.8 G/DL (ref 13–17.7)
POTASSIUM BLD-SCNC: 4.3 MMOL/L (ref 3.5–5.2)
SODIUM BLD-SCNC: 137 MMOL/L (ref 136–145)

## 2019-11-06 PROCEDURE — 97530 THERAPEUTIC ACTIVITIES: CPT

## 2019-11-06 PROCEDURE — 97535 SELF CARE MNGMENT TRAINING: CPT

## 2019-11-06 PROCEDURE — 97165 OT EVAL LOW COMPLEX 30 MIN: CPT

## 2019-11-06 PROCEDURE — 85018 HEMOGLOBIN: CPT | Performed by: ORTHOPAEDIC SURGERY

## 2019-11-06 PROCEDURE — 80048 BASIC METABOLIC PNL TOTAL CA: CPT | Performed by: ORTHOPAEDIC SURGERY

## 2019-11-06 PROCEDURE — 97161 PT EVAL LOW COMPLEX 20 MIN: CPT

## 2019-11-06 PROCEDURE — 85014 HEMATOCRIT: CPT | Performed by: ORTHOPAEDIC SURGERY

## 2019-11-06 PROCEDURE — 97110 THERAPEUTIC EXERCISES: CPT

## 2019-11-06 RX ORDER — OXYCODONE AND ACETAMINOPHEN 10; 325 MG/1; MG/1
1 TABLET ORAL EVERY 6 HOURS PRN
Qty: 28 TABLET | Refills: 0 | Status: SHIPPED | OUTPATIENT
Start: 2019-11-06 | End: 2019-12-16

## 2019-11-06 RX ADMIN — MELOXICAM 15 MG: 15 TABLET ORAL at 08:25

## 2019-11-06 RX ADMIN — ASPIRIN 325 MG: 325 TABLET, DELAYED RELEASE ORAL at 08:25

## 2019-11-06 RX ADMIN — OXYCODONE HYDROCHLORIDE 20 MG: 20 TABLET, FILM COATED, EXTENDED RELEASE ORAL at 08:25

## 2019-11-06 RX ADMIN — CEFAZOLIN SODIUM 2 G: 10 INJECTION, POWDER, FOR SOLUTION INTRAVENOUS at 06:02
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
## 2019-11-06 NOTE — PROGRESS NOTES
Discharge Planning Assessment   Melissa     Patient Name: Jayson Martin  MRN: [id number]  Today's Date: 11/6/2019    Admit Date: 11/5/2019    Discharge Needs Assessment     Row Name 11/06/19 1014       Living Environment    Lives With  spouse    Current Living Arrangements  home/apartment/condo    Primary Care Provided by  self    Provides Primary Care For  no one    Family Caregiver if Needed  spouse    Quality of Family Relationships  helpful    Able to Return to Prior Arrangements  yes       Resource/Environmental Concerns    Resource/Environmental Concerns  none    Transportation Concerns  car, none       Transition Planning    Patient/Family Anticipates Transition to  home with help/services    Transportation Anticipated  family or friend will provide       Discharge Needs Assessment    Readmission Within the Last 30 Days  no previous admission in last 30 days    Concerns to be Addressed  denies needs/concerns at this time    Equipment Currently Used at Home  none    Anticipated Changes Related to Illness  none    Equipment Needed After Discharge  none    Outpatient/Agency/Support Group Needs  homecare agency    Discharge Facility/Level of Care Needs  home with home health    Offered/Gave Vendor List  yes    Patient's Choice of Community Agency(s)  [facility]    Discharge Coordination/Progress  D/C Plan: Home with [facility]. Spoke with patient at bedside. Denies any d/c needs. H/H referral made, orders received.            Home Medical Care      Service Provider Request Status Selected Services Address Phone Number Fax Number    [facility] Pending - Request Sent N/A [address] [facility] [place] [address] [phone]       Latoya Boyer 11/6/2019 1016    Please see restrictions per order.                  Therapy      No service coordination in this encounter.      Community Resources      No service coordination in this encounter.        Expected Discharge Date and Time      Expected Discharge Date Expected Discharge Time    Nov 6, 2019         Demographic Summary     Row Name 11/06/19 1013       General Information    Admission Type  inpatient    Arrived From  operating room    Referral Source  admission list    Reason for Consult  discharge planning    Preferred Language  English     Used During This Interaction  no        Functional Status     Row Name 11/06/19 1013       Functional Status    Usual Activity Tolerance  good    Current Activity Tolerance  good       Functional Status, IADL    Medications  independent    Meal Preparation  independent    Housekeeping  independent    Laundry  independent    Shopping  independent       Mental Status    General Appearance WDL  WDL       Mental Status Summary    Recent Changes in Mental Status/Cognitive Functioning  no changes       Employment/    Employment Status  retired              Latoya Boyer
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
## 2019-11-06 NOTE — PROGRESS NOTES
Continued Stay Note  ALMAZ Womack     Patient Name: Jayson Martin  MRN: [id number]  Today's Date: 11/6/2019    Admit Date: 11/5/2019    Discharge Plan     Row Name 11/06/19 1022       Plan    Plan  D/C Plan: Home with [facility] H/[facility].    Plan Comments  see assessment.             Expected Discharge Date and Time     Expected Discharge Date Expected Discharge Time    Nov 6, 2019             Latoya Boyer

## 2019-11-06 NOTE — PLAN OF CARE
Problem: Patient Care Overview  Goal: Plan of Care Review  Outcome: Ongoing (interventions implemented as appropriate)   11/06/19 0003   Coping/Psychosocial   Plan of Care Reviewed With patient   Plan of Care Review   Progress improving   OTHER   Outcome Summary No pain present. PT to see in AM. Patient eager for DC

## 2019-11-06 NOTE — DISCHARGE SUMMARY
Date of Discharge:  11/6/2019    Discharge Diagnosis: Right shoulder degenerative joint disease    Presenting Problem/History of Present Illness  Active Hospital Problems    Diagnosis  POA   • **Status post reverse arthroplasty of right shoulder [Z96.611]  Not Applicable   • DJD of shoulder [M19.019]  Yes   • Osteoarthritis of right glenohumeral joint [M19.011]  Unknown      Resolved Hospital Problems   No resolved problems to display.        Hospital Course  Patient is a 69 y.o. male presented with right shoulder pain with a massive cuff tear.  He underwent reverse shoulder arthroplasty and postop day 1 was tolerating diet and oral medication.      Procedures Performed: Right reverse total shoulder arthroplasty      Consults:   Consults     Date and Time Order Name Status Description    11/5/2019 2202 Inpatient Consult to Hospitalist              Condition on Discharge:  Improved    Vital Signs  Vitals:    11/05/19 1955 11/05/19 2215 11/06/19 0218 11/06/19 0618   BP: 107/65 110/71 99/58 102/59   BP Location: Left arm Left arm Left arm Left arm   Patient Position: Lying Lying Lying Lying   Pulse: 64 74 66 65   Resp: 16 14 15 13   Temp: 98 °F (36.7 °C) 98.1 °F (36.7 °C) 98.3 °F (36.8 °C) 98.2 °F (36.8 °C)   TempSrc: Oral Oral Oral Oral   SpO2: 90% 94% 90% 93%   Weight:       Height:           Physical Exam:  Dry dressing, sensation intact to the digits and over the deltoid but still some weakness in the hand secondary to his block. Radial pulse is palpable and capillary refill is less than two seconds to all digits       Discharge Disposition  Home    Discharge Medications     Discharge Medications      New Medications      Instructions Start Date   oxyCODONE-acetaminophen  MG per tablet  Commonly known as:  PERCOCET   1 tablet, Oral, Every 6 Hours PRN         Continue These Medications      Instructions Start Date   ibuprofen 400 MG tablet  Commonly known as:  ADVIL,MOTRIN   400 mg, Oral, Every 6 Hours PRN,  Stop 10-29-19 for surgery                Discharge instructions:  Continue sling.  Keep dressing on.  Okay to shower and perform home exercises.  Continue polar care.    Follow-up Appointments  Future Appointments   Date Time Provider Department Center   11/18/2019  9:15 AM Babak Elias MD [facility] None              Babak Elias MD  11/06/19  9:32 AM      Disclaimer: Please note that areas of this note were completed with computer voice recognition software.  Quite often unanticipated grammatical, syntax, homophones, and other interpretive errors are inadvertently transcribed by the computer software. Please excuse any errors that have escaped final proofreading.

## 2019-11-06 NOTE — PLAN OF CARE
Problem: Patient Care Overview  Goal: Plan of Care Review  Outcome: Ongoing (interventions implemented as appropriate)   11/06/19 8226   Coping/Psychosocial   Plan of Care Reviewed With patient   OTHER   Outcome Summary Pt doing well POD#1 R rTSA. He is receptive to all education and was able to complete self care with supervision. He will require HH therapy at discharge.

## 2019-11-06 NOTE — DISCHARGE PLACEMENT REQUEST
"Jayson Martin (69 y.o. Male)     Date of Birth Social Security Number Address Home Phone MRN    1950  [address] [place] [address] [phone] [id number]    Spiritism Marital Status          None        Admission Date Admission Type Admitting Provider Attending Provider Department, Room/Bed    11/5/19 Elective Babak Elias MD Abeln, Kristopher Todd, MD [facility] SURGICAL INPATIENT, 4128/1    Discharge Date Discharge Disposition Discharge Destination         Home or Self Care              Attending Provider:  Babak Elias MD    Allergies:  No Known Allergies    Isolation:  None   Infection:  None   Code Status:  CPR    Ht:  175.3 cm (69\")   Wt:  72.3 kg (159 lb 6.3 oz)    Admission Cmt:  None   Principal Problem:  Status post reverse arthroplasty of right shoulder [Z96.611]                 Active Insurance as of 11/5/2019     Primary Coverage     Payor Plan Insurance Group Employer/Plan Group    HUMANA HUMANA [id number]     Payor Plan Address Payor Plan Phone Number Payor Plan Fax Number Effective Dates    [address] [address] [phone]  1/1/2019 - None Entered    [facility] [address]       Subscriber Name Subscriber Birth Date Member ID       DARRELL MARTIN 11/24/1960 [id number]           Secondary Coverage     Payor Plan Insurance Group Employer/Plan Group    MEDICARE MEDICARE A & B      Payor Plan Address Payor Plan Phone Number Payor Plan Fax Number Effective Dates    [address] [phone]  9/1/2017 - None Entered    [facility] [id number]       Subscriber Name Subscriber Birth Date Member ID       JAYSON MARTIN 1950 [id number]                 Emergency Contacts      (Rel.) Home Phone Work Phone Mobile Phone    LINCOLN MARTIN (Spouse) [phone] -- [phone]              "

## 2019-11-06 NOTE — PROGRESS NOTES
"      [facility] Medicine Services Daily Progress Note      Hospitalist Team  LOS 1 days      Patient Care Team:  Amber Stuart DO as PCP - General (Family Medicine)  Penny Medina as Technologist        Chief Complaint / Subjective  No chief complaint on file.      Brief Synopsis of Hospital Course/HPI      History of present illness:  Jayson Martin is a 69 y.o. male with no past medical history other than osteoarthritis of right glenohumeral joint who presented to the hospital for right total shoulder reverse arthroplasty by Dr. Elias 11/5/2019. He had peripheral nerve block by anesthesia. He continues to have numbness to his right shoulder/hand. The hospitalist was consulted for medical management. The patient has not major medical problems and had no complaints on exam.              ROS            Objective      Vital Signs  Temp:  [96.9 °F (36.1 °C)-98.3 °F (36.8 °C)] 98.2 °F (36.8 °C)  Heart Rate:  [64-81] 65  Resp:  [9-20] 13  BP: ()/(58-89) 102/59  Oxygen Therapy  SpO2: 93 %  Pulse Oximetry Type: Continuous  Device (Oxygen Therapy): room air  Flow (L/min): 2  Flowsheet Rows      First Filed Value   Admission Height  175.3 cm (69\") Documented at 11/05/2019 1035   Admission Weight  72.3 kg (159 lb 6.3 oz) Documented at 11/05/2019 1035        Intake & Output (last 3 days)       11/03 0701 - 11/04 0700 11/04 0701 - 11/05 0700 11/05 0701 - 11/06 0700 11/06 0701 - 11/07 0700    P.O.   820     I.V. (mL/kg)   3000 (41.5)     Total Intake(mL/kg)   3820 (52.8)     Urine (mL/kg/hr)   725     Total Output   725     Net   +3095                 Lines, Drains & Airways    Active LDAs     Name:   Placement date:   Placement time:   Site:   Days:    Peripheral IV 11/05/19 1123 Left Hand   11/05/19    1123    Hand   less than 1                  Physical Exam:    Physical Exam      Procedures:    Procedure(s):  TOTAL SHOULDER REVERSE ARTHROPLASTY          Results Review:     I reviewed the " patient's new clinical results.      Results from last 7 days   Lab Units 19  0303   HEMOGLOBIN g/dL 12.8*   HEMATOCRIT % 37.6     Results from last 7 days   Lab Units 19  0303   SODIUM mmol/L 137   POTASSIUM mmol/L 4.3   CHLORIDE mmol/L 101   CO2 mmol/L 26.0   BUN mg/dL 19   CREATININE mg/dL 0.97   CALCIUM mg/dL 8.5*   GLUCOSE mg/dL 149*         Lab Results   Component Value Date    CALCIUM 8.5 (L) 2019     No results found for: HGBA1C                Microbiology Results (last 10 days)     Procedure Component Value - Date/Time    MRSA Screen Culture - Swab, Nares [[id number]]  (Normal) Collected:  10/28/19 1203    Lab Status:  Final result Specimen:  Swab from Nares Updated:  10/29/19 1253     MRSA SCREEN CX No Methicillin Resistant Staphylococcus aureus isolated          ECG/EMG Results (most recent)     None          Results for orders placed during the hospital encounter of 18   Duplex Venous Lower Extremity - Bilateral CAR    Narrative                   Lower Extremity Venous Report                          [facility]                         Vascular Laboratory                            [address] [id number]    Name: CHRIS MONTANO            Study Date: 2018 02:09 PM  MRN: [id number]                       Patient Location:   : 1950 (M/d/yyyy)           Gender: Male  Age: 68 yrs                          Account#: [id number]  Reason For Study: varicose veins      Procedure  Venous study was carried out in bilateral lower extremities. Gray scale, color  flow, and spectral doppler images were obtained. Images were obtained in  transverse and longitudinal views.    Interpretation Summary  Radiologist to read exam, results will be found in one content.    _______________________________________________________________________________    Electronically signed by: SAMANTHA  on 2018 09:32 AM  Ordering Physician: RIKY  LIBORIO  Referring Physician: EMIR LAMB  Performed By:          Results for orders placed in visit on 09/25/19   SCANNED - ECHOCARDIOGRAM       Xr Shoulder 2+ View Right    Result Date: 11/5/2019  Right reverse shoulder prosthesis placement with expected alignment.  Electronically Signed By-Marcus Shook On:11/5/2019 6:01 PM This report was finalized on [id number] by  Marcus Shook, .      Xrays, labs reviewed personally by physician.    Medication Review:   I have reviewed the patient's current medication list      Scheduled Meds    aspirin 325 mg Oral Q12H   ceFAZolin 2 g Intravenous Q8H   meloxicam 15 mg Oral Daily   oxyCODONE 20 mg Oral Q12H       Meds Infusions    lactated ringers 9 mL/hr Last Rate: 9 mL/hr (11/05/19 1129)   sodium chloride 75 mL/hr Last Rate: 75 mL/hr (11/05/19 2033)       Meds PRN  •  acetaminophen **OR** acetaminophen  •  bisacodyl  •  diphenhydrAMINE **OR** diphenhydrAMINE  •  diphenhydrAMINE  •  lactated ringers  •  magnesium hydroxide  •  Morphine **AND** naloxone  •  ondansetron  •  ondansetron  •  oxyCODONE  •  promethazine  •  traMADol        Assessment / Plan    Active Hospital Problems    Diagnosis POA   • **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 Elias 11/5/2019  -has peripheral nerve block  -neurovascular checks per ortho  -on IV cefazolin x3 doses  -activity per ortho  -pain control per ortho     Tobacco abuse  -encourage cessation      VTE prophylaxis - on 325mg aspirin BID per ortho             Discharge Planning    Destination      No service coordination in this encounter.      Durable Medical Equipment      No service coordination in this encounter.      Dialysis/Infusion       No service coordination in this encounter.      Home Medical Care      No service coordination in this encounter.      Therapy      No service coordination in this encounter.      Community Resources      No service coordination in this encounter.          Bk Singer MD  11/06/19  8:43 AM
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
## 2019-11-07 ENCOUNTER — READMISSION MANAGEMENT (OUTPATIENT)
Dept: CALL CENTER | Facility: HOSPITAL | Age: 69
End: 2019-11-07

## 2019-11-07 NOTE — PROGRESS NOTES
Case Management Discharge Note    Final Note: home with [facility]              Final Discharge Disposition Code: 06 - home with home health care

## 2019-11-08 NOTE — OUTREACH NOTE
Prep Survey      Responses   Facility patient discharged from?  [facility]   Is patient eligible?  No   What are the reasons patient is not eligible?  Other [scheduled shoulder arthroplasty]   Does the patient have one of the following disease processes/diagnoses(primary or secondary)?  Total Joint Replacement   Prep survey completed?  Yes          Augusta Mcnulty RN

## 2019-11-12 ENCOUNTER — TELEPHONE (OUTPATIENT)
Dept: SURGERY | Facility: HOSPITAL | Age: 69
End: 2019-11-12

## 2019-11-12 NOTE — TELEPHONE ENCOUNTER
Doing well.  Quit taking pain pills 2 days ago due to causing constipation.  Just taking tylenol - pain well-controlled with that.  [facility] PT going well.  No needs at this time.

## 2019-11-18 ENCOUNTER — OFFICE VISIT (OUTPATIENT)
Dept: ORTHOPEDIC SURGERY | Facility: CLINIC | Age: 69
End: 2019-11-18

## 2019-11-18 VITALS
BODY MASS INDEX: 23.55 KG/M2 | WEIGHT: 159 LBS | SYSTOLIC BLOOD PRESSURE: 123 MMHG | HEART RATE: 72 BPM | HEIGHT: 69 IN | DIASTOLIC BLOOD PRESSURE: 76 MMHG

## 2019-11-18 DIAGNOSIS — M19.011 OSTEOARTHRITIS OF RIGHT GLENOHUMERAL JOINT: Primary | ICD-10-CM

## 2019-11-18 DIAGNOSIS — Z47.89 ORTHOPEDIC AFTERCARE: ICD-10-CM

## 2019-11-18 PROCEDURE — 99024 POSTOP FOLLOW-UP VISIT: CPT | Performed by: ORTHOPAEDIC SURGERY

## 2019-11-18 NOTE — PATIENT INSTRUCTIONS
Shoulder Arthroplasty: Post- Operative Visit Objectives    1) Review the operative findings, procedures and photos.  2) Make sure medications are effective and not causing problems.  a) Pain: Oxycodone or hydrocodone is for pain. You may take 1 tablet every 6 hours as necessary.  Some patients don’t require the use of these…in those circumstances just use Tylenol extra-strength 1 or 2 tablets every 4-6 hours.   b) NSAIDs. For pain and inflammation.  You can take an over the counter anti-inflammatory of choice such as Aleve, Ibuprofen, Motrin or Advil during this time.  If you have had any problems stop taking these medicines and please tell us!  3) Wound Care:  a) Today we will remove your dressings.  There may be some residual glue on your incision.  It is now ok to shower without covering it. Please avoid submerging the incision for another two weeks.  Continue ice pack as needed.  4) Exercises and Physical Therapy   Continue your physical therapy and daily home exercises focusing especially on overhead motion.  Continue the sling and remove for activities such as showering and bringing you hand to your face or hair, no motion behind your back for the next 4 weeks.  Some shoulder replacements with a rotator cuff repair will have more restrictions and we will go over those.   5) Follow Up appointments Schedule Follow up visits as directed usually in 4 weeks..  6) Notes  Make sure you have all necessary notes and documentation for school or work.  7) Issues: Remember our goal is to make this process smooth and easy if there is any thing you need please ask us or call back [phone]  8)

## 2019-11-18 NOTE — PROGRESS NOTES
"     Patient ID: Jayson Martin is a 69 y.o. male.  11/5/198 right reverse total shoulder  States that therapy has not been moving his arm overhead      Objective:    /76   Pulse 72   Ht 175.3 cm (69\")   Wt 72.1 kg (159 lb)   BMI 23.48 kg/m²     Physical Examination:    Incision is healed.  Passive elevation 80 degrees.Sensory and motor exam are intact all distributions. Radial pulse is palpable and capillary refill is less than two seconds to all digits    Imaging:  Reverse total shoulder in position    Assessment:  Doing well after reverse total shoulder    Plan:  I will send him a protocol with him, instructed him on self-guided rehab for stretching.  See me in a month  "

## 2019-11-25 DIAGNOSIS — M19.011 OSTEOARTHRITIS OF RIGHT GLENOHUMERAL JOINT: ICD-10-CM

## 2019-11-25 DIAGNOSIS — Z47.89 ORTHOPEDIC AFTERCARE: Primary | ICD-10-CM

## 2019-12-16 ENCOUNTER — OFFICE VISIT (OUTPATIENT)
Dept: ORTHOPEDIC SURGERY | Facility: CLINIC | Age: 69
End: 2019-12-16

## 2019-12-16 VITALS
HEIGHT: 69 IN | WEIGHT: 163 LBS | HEART RATE: 70 BPM | SYSTOLIC BLOOD PRESSURE: 103 MMHG | BODY MASS INDEX: 24.14 KG/M2 | DIASTOLIC BLOOD PRESSURE: 65 MMHG

## 2019-12-16 DIAGNOSIS — Z47.89 ORTHOPEDIC AFTERCARE: Primary | ICD-10-CM

## 2019-12-16 PROCEDURE — 99024 POSTOP FOLLOW-UP VISIT: CPT | Performed by: ORTHOPAEDIC SURGERY

## 2019-12-16 NOTE — PROGRESS NOTES
"     Patient ID: Jayson Martin is a 69 y.o. male.  11/5/19 right reverse total shoulder  Pain improving      Objective:    /65   Pulse 70   Ht 175.3 cm (69\")   Wt 73.9 kg (163 lb)   BMI 24.07 kg/m²     Physical Examination:  Incision is healed, passive elevation 130 degrees abduction 90 degrees external rotation 20 degrees    Imaging:      Assessment:  Improving but still mild stiffness after shoulder arthroplasty    Plan:  Discontinue sling, continue therapy and see me with x-ray in 4 months  "

## 2020-01-13 ENCOUNTER — OFFICE VISIT (OUTPATIENT)
Dept: ORTHOPEDIC SURGERY | Facility: CLINIC | Age: 70
End: 2020-01-13

## 2020-01-13 VITALS
WEIGHT: 163 LBS | HEART RATE: 78 BPM | BODY MASS INDEX: 24.14 KG/M2 | HEIGHT: 69 IN | SYSTOLIC BLOOD PRESSURE: 113 MMHG | DIASTOLIC BLOOD PRESSURE: 73 MMHG

## 2020-01-13 DIAGNOSIS — M19.011 OSTEOARTHRITIS OF RIGHT GLENOHUMERAL JOINT: Primary | ICD-10-CM

## 2020-01-13 DIAGNOSIS — Z47.89 ORTHOPEDIC AFTERCARE: ICD-10-CM

## 2020-01-13 PROCEDURE — 99024 POSTOP FOLLOW-UP VISIT: CPT | Performed by: ORTHOPAEDIC SURGERY

## 2020-01-13 NOTE — PROGRESS NOTES
"     Patient ID: Jayson Martin is a 69 y.o. male.  11/5/19 right reverse total shoulder  Pain improving      Objective:    /73   Pulse 78   Ht 175.3 cm (69\")   Wt 73.9 kg (163 lb)   BMI 24.07 kg/m²     Physical Examination:  Incision is healed without bony tenderness, active elevation 160 degrees external rotation 30 degrees      Imaging:  Reverse total shoulder in position    Assessment:  Doing well after reverse total shoulder    Plan:  Activity as tolerated and see me with x-ray in 3 months  "

## 2020-04-15 ENCOUNTER — OFFICE VISIT (OUTPATIENT)
Dept: ORTHOPEDIC SURGERY | Facility: CLINIC | Age: 70
End: 2020-04-15

## 2020-04-15 VITALS
SYSTOLIC BLOOD PRESSURE: 133 MMHG | WEIGHT: 163 LBS | BODY MASS INDEX: 24.14 KG/M2 | HEART RATE: 80 BPM | DIASTOLIC BLOOD PRESSURE: 85 MMHG | HEIGHT: 69 IN

## 2020-04-15 DIAGNOSIS — Z47.89 ORTHOPEDIC AFTERCARE: ICD-10-CM

## 2020-04-15 DIAGNOSIS — M19.011 OSTEOARTHRITIS OF RIGHT GLENOHUMERAL JOINT: Primary | ICD-10-CM

## 2020-04-15 PROCEDURE — 99212 OFFICE O/P EST SF 10 MIN: CPT | Performed by: ORTHOPAEDIC SURGERY

## 2020-04-15 NOTE — PROGRESS NOTES
Patient ID: Jayson Martin is a 69 y.o. male.  Right shoulder pain  11/5/19 right reverse total shoulder  Pain minimal    Review of Systems:    Right shoulder pain resolved    Objective:    There were no vitals taken for this visit.    Physical Examination:   He is a pleasant male in no distress. He is alert and oriented x3 and appears his stated age.  Right shoulder demonstrates healed incision without bony tenderness, active elevation 170 degrees abduction 130 degrees external tension 40 degrees internal rotation is L5.Sensory and motor exam are intact all distributions. Radial pulse is palpable and capillary refill is less than two seconds to all digits      Imaging:   Reverse total shoulder position    Assessment:    Doing well to reverse total shoulder    Plan:  Activity as tolerated with x-ray in 7 months          Disclaimer: Please note that areas of this note were completed with computer voice recognition software.  Quite often unanticipated grammatical, syntax, homophones, and other interpretive errors are inadvertently transcribed by the computer software. Please excuse any errors that have escaped final proofreading.

## 2020-11-04 ENCOUNTER — OFFICE VISIT (OUTPATIENT)
Dept: ORTHOPEDIC SURGERY | Facility: CLINIC | Age: 70
End: 2020-11-04

## 2020-11-04 VITALS
BODY MASS INDEX: 24.14 KG/M2 | WEIGHT: 163 LBS | HEART RATE: 76 BPM | DIASTOLIC BLOOD PRESSURE: 60 MMHG | SYSTOLIC BLOOD PRESSURE: 100 MMHG | HEIGHT: 69 IN

## 2020-11-04 DIAGNOSIS — M25.511 ACUTE PAIN OF RIGHT SHOULDER: Primary | ICD-10-CM

## 2020-11-04 PROCEDURE — 99212 OFFICE O/P EST SF 10 MIN: CPT | Performed by: ORTHOPAEDIC SURGERY

## 2020-11-04 NOTE — PROGRESS NOTES
"     Patient ID: Jayson Martin is a 70 y.o. male.  Right shoulder pain  11/5/19 right reverse total shoulder  Pain  resolved    Review of Systems:    Right shoulder pain resolved    Objective:    /60   Pulse 76   Ht 175.3 cm (69\")   Wt 73.9 kg (163 lb)   BMI 24.07 kg/m²     Physical Examination:     He is a pleasant male in no distress. He is alert and oriented x3 and appears his stated age.  Right shoulder demonstrates a healed incision without bony tenderness, active elevation 160 degrees abduction 120 degrees external rotation 30 degrees.Sensory and motor exam are intact all distributions. Radial pulse is palpable and capillary refill is less than two seconds to all digits    Imaging:   Reverse total shoulder in position    Assessment:    Doing well after reverse total shoulder    Plan:  Activity as tolerated and see me as needed          Disclaimer: Please note that areas of this note were completed with computer voice recognition software.  Quite often unanticipated grammatical, syntax, homophones, and other interpretive errors are inadvertently transcribed by the computer software. Please excuse any errors that have escaped final proofreading.  "

## 2021-09-23 ENCOUNTER — OFFICE VISIT (OUTPATIENT)
Dept: FAMILY MEDICINE CLINIC | Facility: CLINIC | Age: 71
End: 2021-09-23

## 2021-09-23 VITALS
WEIGHT: 163 LBS | DIASTOLIC BLOOD PRESSURE: 65 MMHG | OXYGEN SATURATION: 96 % | SYSTOLIC BLOOD PRESSURE: 117 MMHG | RESPIRATION RATE: 12 BRPM | BODY MASS INDEX: 24.14 KG/M2 | HEIGHT: 69 IN | TEMPERATURE: 98.2 F | HEART RATE: 64 BPM

## 2021-09-23 DIAGNOSIS — Z13.220 ENCOUNTER FOR LIPID SCREENING FOR CARDIOVASCULAR DISEASE: ICD-10-CM

## 2021-09-23 DIAGNOSIS — L81.9 PIGMENTED SKIN LESIONS: Primary | ICD-10-CM

## 2021-09-23 DIAGNOSIS — Z12.5 ENCOUNTER FOR SCREENING FOR MALIGNANT NEOPLASM OF PROSTATE: ICD-10-CM

## 2021-09-23 DIAGNOSIS — Z13.6 ENCOUNTER FOR LIPID SCREENING FOR CARDIOVASCULAR DISEASE: ICD-10-CM

## 2021-09-23 PROCEDURE — 99213 OFFICE O/P EST LOW 20 MIN: CPT | Performed by: FAMILY MEDICINE

## 2021-09-23 RX ORDER — CHOLECALCIFEROL (VITAMIN D3) 125 MCG
500 CAPSULE ORAL DAILY
Start: 2021-09-23

## 2021-10-01 ENCOUNTER — CLINICAL SUPPORT (OUTPATIENT)
Dept: FAMILY MEDICINE CLINIC | Facility: CLINIC | Age: 71
End: 2021-10-01

## 2021-10-01 DIAGNOSIS — Z12.5 ENCOUNTER FOR SCREENING FOR MALIGNANT NEOPLASM OF PROSTATE: ICD-10-CM

## 2021-10-01 DIAGNOSIS — Z13.220 ENCOUNTER FOR LIPID SCREENING FOR CARDIOVASCULAR DISEASE: ICD-10-CM

## 2021-10-01 DIAGNOSIS — Z13.6 ENCOUNTER FOR LIPID SCREENING FOR CARDIOVASCULAR DISEASE: ICD-10-CM

## 2021-10-01 PROCEDURE — 36415 COLL VENOUS BLD VENIPUNCTURE: CPT | Performed by: FAMILY MEDICINE

## 2021-10-01 PROCEDURE — 80061 LIPID PANEL: CPT | Performed by: FAMILY MEDICINE

## 2021-10-01 PROCEDURE — G0103 PSA SCREENING: HCPCS | Performed by: FAMILY MEDICINE

## 2021-10-01 PROCEDURE — 80053 COMPREHEN METABOLIC PANEL: CPT | Performed by: FAMILY MEDICINE

## 2021-10-02 LAB
ALBUMIN SERPL-MCNC: 4.7 G/DL (ref 3.5–5.2)
ALBUMIN/GLOB SERPL: 1.8 G/DL
ALP SERPL-CCNC: 62 U/L (ref 39–117)
ALT SERPL W P-5'-P-CCNC: 17 U/L (ref 1–41)
ANION GAP SERPL CALCULATED.3IONS-SCNC: 11.1 MMOL/L (ref 5–15)
AST SERPL-CCNC: 18 U/L (ref 1–40)
BILIRUB SERPL-MCNC: 0.9 MG/DL (ref 0–1.2)
BUN SERPL-MCNC: 15 MG/DL (ref 8–23)
BUN/CREAT SERPL: 15.5 (ref 7–25)
CALCIUM SPEC-SCNC: 9.4 MG/DL (ref 8.6–10.5)
CHLORIDE SERPL-SCNC: 102 MMOL/L (ref 98–107)
CHOLEST SERPL-MCNC: 192 MG/DL (ref 0–200)
CO2 SERPL-SCNC: 26.9 MMOL/L (ref 22–29)
CREAT SERPL-MCNC: 0.97 MG/DL (ref 0.76–1.27)
GFR SERPL CREATININE-BSD FRML MDRD: 77 ML/MIN/1.73
GLOBULIN UR ELPH-MCNC: 2.6 GM/DL
GLUCOSE SERPL-MCNC: 98 MG/DL (ref 65–99)
HDLC SERPL-MCNC: 42 MG/DL (ref 40–60)
LDLC SERPL CALC-MCNC: 129 MG/DL (ref 0–100)
LDLC/HDLC SERPL: 3.01 {RATIO}
POTASSIUM SERPL-SCNC: 4.7 MMOL/L (ref 3.5–5.2)
PROT SERPL-MCNC: 7.3 G/DL (ref 6–8.5)
PSA SERPL-MCNC: 0.47 NG/ML (ref 0–4)
SODIUM SERPL-SCNC: 140 MMOL/L (ref 136–145)
TRIGL SERPL-MCNC: 118 MG/DL (ref 0–150)
VLDLC SERPL-MCNC: 21 MG/DL (ref 5–40)

## 2021-11-02 DIAGNOSIS — Z12.11 COLON CANCER SCREENING: Primary | ICD-10-CM

## 2023-01-31 ENCOUNTER — TELEPHONE (OUTPATIENT)
Dept: FAMILY MEDICINE CLINIC | Facility: CLINIC | Age: 73
End: 2023-01-31
Payer: MEDICARE

## 2023-03-31 ENCOUNTER — OFFICE VISIT (OUTPATIENT)
Dept: FAMILY MEDICINE CLINIC | Facility: CLINIC | Age: 73
End: 2023-03-31
Payer: MEDICARE

## 2023-03-31 VITALS
HEIGHT: 69 IN | WEIGHT: 174 LBS | BODY MASS INDEX: 25.77 KG/M2 | SYSTOLIC BLOOD PRESSURE: 116 MMHG | OXYGEN SATURATION: 96 % | TEMPERATURE: 98.2 F | DIASTOLIC BLOOD PRESSURE: 71 MMHG | HEART RATE: 70 BPM | RESPIRATION RATE: 14 BRPM

## 2023-03-31 DIAGNOSIS — Z13.220 ENCOUNTER FOR LIPID SCREENING FOR CARDIOVASCULAR DISEASE: ICD-10-CM

## 2023-03-31 DIAGNOSIS — Z13.6 ENCOUNTER FOR LIPID SCREENING FOR CARDIOVASCULAR DISEASE: ICD-10-CM

## 2023-03-31 DIAGNOSIS — R53.83 OTHER FATIGUE: ICD-10-CM

## 2023-03-31 DIAGNOSIS — Z00.00 MEDICARE ANNUAL WELLNESS VISIT, SUBSEQUENT: Primary | ICD-10-CM

## 2023-03-31 DIAGNOSIS — Z12.5 ENCOUNTER FOR SCREENING FOR MALIGNANT NEOPLASM OF PROSTATE: ICD-10-CM

## 2023-03-31 PROCEDURE — 80053 COMPREHEN METABOLIC PANEL: CPT | Performed by: FAMILY MEDICINE

## 2023-03-31 PROCEDURE — 82607 VITAMIN B-12: CPT | Performed by: FAMILY MEDICINE

## 2023-03-31 PROCEDURE — 36415 COLL VENOUS BLD VENIPUNCTURE: CPT | Performed by: FAMILY MEDICINE

## 2023-03-31 PROCEDURE — 80061 LIPID PANEL: CPT | Performed by: FAMILY MEDICINE

## 2023-03-31 PROCEDURE — G0438 PPPS, INITIAL VISIT: HCPCS | Performed by: FAMILY MEDICINE

## 2023-03-31 PROCEDURE — 1159F MED LIST DOCD IN RCRD: CPT | Performed by: FAMILY MEDICINE

## 2023-03-31 PROCEDURE — G0103 PSA SCREENING: HCPCS | Performed by: FAMILY MEDICINE

## 2023-03-31 PROCEDURE — 1160F RVW MEDS BY RX/DR IN RCRD: CPT | Performed by: FAMILY MEDICINE

## 2023-03-31 RX ORDER — MELATONIN
1000 DAILY
COMMUNITY

## 2023-03-31 RX ORDER — ALBUTEROL SULFATE 90 UG/1
2 AEROSOL, METERED RESPIRATORY (INHALATION) EVERY 6 HOURS PRN
Qty: 18 G | Refills: 0 | Status: SHIPPED | OUTPATIENT
Start: 2023-03-31

## 2023-03-31 NOTE — PROGRESS NOTES
Venipuncture performed in left arm by Sylvia Blair CMA  with good hemostasis. Patient tolerated well. 03/31/23 Sylvia Blair CMA

## 2023-03-31 NOTE — PROGRESS NOTES
The ABCs of the Annual Wellness Visit  Initial Medicare Wellness Visit    Chief Complaint   Patient presents with   • Medicare Wellness-subsequent       Subjective   History of Present Illness:  Jayson Martin is a 72 y.o. male who presents for an Initial Medicare Wellness Visit.    HEALTH RISK ASSESSMENT    Recent Hospitalizations:  No hospitalization(s) within the last year.    Current Medical Providers:  Patient Care Team:  Amber Stuart DO as PCP - General (Family Medicine)  Penny Medina as Technologist    Smoking Status:  Social History     Tobacco Use   Smoking Status Former   • Packs/day: 0.25   • Years: 15.00   • Pack years: 3.75   • Types: Cigarettes   • Start date:    • Quit date: 3/10/2023   • Years since quittin.0   • Passive exposure: Current   Smokeless Tobacco Never   Tobacco Comments    1ppd x 35yr/ 1/2 ppd x 5 yrs       Alcohol Consumption:  Social History     Substance and Sexual Activity   Alcohol Use No       Depression Screen:       3/31/2023    11:06 AM   PHQ-2/PHQ-9 Depression Screening   Little Interest or Pleasure in Doing Things 0-->not at all   Feeling Down, Depressed or Hopeless 0-->not at all   PHQ-9: Brief Depression Severity Measure Score 0       Fall Risk Screen:  TONI Fall Risk Assessment was completed, and patient is at LOW risk for falls.Assessment completed on:3/31/2023    Health Habits and Functional and Cognitive Screening:      3/31/2023    10:00 AM   Functional & Cognitive Status   Do you have difficulty preparing food and eating? No   Do you have difficulty bathing yourself, getting dressed or grooming yourself? No   Do you have difficulty using the toilet? No   Do you have difficulty moving around from place to place? No   Do you have trouble with steps or getting out of a bed or a chair? No   Current Diet Well Balanced Diet   Dental Exam Not up to date   Eye Exam Not up to date   Exercise (times per week) 7 times per week   Current Exercises Include  Other        Exercise Comment [place] the farm daily   Do you need help using the phone?  No   Are you deaf or do you have serious difficulty hearing?  No   Do you need help with transportation? No   Do you need help shopping? No   Do you need help preparing meals?  No   Do you need help with housework?  No   Do you need help with laundry? No   Do you need help taking your medications? No   Do you need help managing money? No   Do you ever drive or ride in a car without wearing a seat belt? No   Have you felt unusual stress, anger or loneliness in the last month? No   Who do you live with? Spouse   If you need help, do you have trouble finding someone available to you? No   Have you been bothered in the last four weeks by sexual problems? No   Do you have difficulty concentrating, remembering or making decisions? No         Does the patient have evidence of cognitive impairment? No    Asprin use counseling:Does not need ASA (and currently is not on it)    Age-appropriate Screening Schedule:  Refer to the list below for future screening recommendations based on patient's age, sex and/or medical conditions. Orders for these recommended tests are listed in the plan section. The patient has been provided with a written plan.    Health Maintenance   Topic Date Due   • HEPATITIS C SCREENING  Never done   • AAA SCREEN (ONE-TIME)  Never done   • COVID-19 Vaccine (4 - Booster for Moderna series) 02/09/2022   • INFLUENZA VACCINE  08/01/2023   • ANNUAL WELLNESS VISIT  03/31/2024   • COLORECTAL CANCER SCREENING  11/11/2024   • TDAP/TD VACCINES (2 - Tdap) 07/31/2029   • Pneumococcal Vaccine 65+  Completed   • ZOSTER VACCINE  Completed          The following portions of the patient's history were reviewed and updated as appropriate: allergies, current medications, past family history, past medical history, past social history, past surgical history and problem list.    Outpatient Medications Prior to Visit   Medication Sig  "Dispense Refill   • cholecalciferol (QC Vitamin D3) 25 MCG (1000 UT) tablet Take 1 tablet by mouth Daily.     • vitamin B-12 (CYANOCOBALAMIN) 500 MCG tablet Take 1 tablet by mouth Daily.     • ibuprofen (ADVIL,MOTRIN) 400 MG tablet Take 400 mg by mouth Every 6 (Six) Hours As Needed for Mild Pain . Stop 10-29-19 for surgery (Patient not taking: Reported on 3/31/2023)       No facility-administered medications prior to visit.       Patient Active Problem List   Diagnosis   • Elevated blood-pressure reading without diagnosis of hypertension   • Encounter for lipid screening for cardiovascular disease   • Encounter for screening for malignant neoplasm of prostate   • Varicose veins of right lower extremity with inflammation   • Osteoarthritis of right glenohumeral joint   • DJD of shoulder   • Status post reverse arthroplasty of right shoulder       Advanced Care Planning:  ACP discussion was held with the patient during this visit. Patient has an advance directive in EMR which is still valid.     Review of Systems    Compared to one year ago, the patient feels his physical health is worse.  Compared to one year ago, the patient feels his mental health is the same.    Reviewed chart for potential of high risk medication in the elderly: yes  Reviewed chart for potential of harmful drug interactions in the elderly:yes    Objective         Vitals:    03/31/23 1058   BP: 116/71   BP Location: Left arm   Patient Position: Sitting   Cuff Size: Adult   Pulse: 70   Resp: 14   Temp: 98.2 °F (36.8 °C)   TempSrc: Infrared   SpO2: 96%   Weight: 78.9 kg (174 lb)   Height: 175.3 cm (69\")       Body mass index is 25.7 kg/m².  Discussed the patient's BMI with him. The BMI is in the acceptable range.    Physical Exam  Vitals and nursing note reviewed.   Constitutional:       General: He is not in acute distress.     Appearance: He is well-developed. He is not ill-appearing or toxic-appearing.   HENT:      Head: Normocephalic and " atraumatic.   Cardiovascular:      Rate and Rhythm: Normal rate and regular rhythm.      Heart sounds: Normal heart sounds. No murmur heard.  Pulmonary:      Effort: Pulmonary effort is normal. No respiratory distress.      Breath sounds: No stridor. Decreased breath sounds present. No wheezing, rhonchi or rales.   Skin:     General: Skin is warm and dry.      Findings: No rash.   Neurological:      Mental Status: He is alert and oriented to person, place, and time.      Cranial Nerves: No cranial nerve deficit.   Psychiatric:         Attention and Perception: Attention and perception normal.         Mood and Affect: Mood and affect normal.         Speech: Speech normal.         Behavior: Behavior normal. Behavior is cooperative.         Thought Content: Thought content normal.         Cognition and Memory: Cognition and memory normal.         Judgment: Judgment normal.           Lab Results   Component Value Date    TRIG 164 (H) 03/31/2023    HDL 46 03/31/2023     (H) 03/31/2023    VLDL 30 03/31/2023        Assessment & Plan   Medicare Risks and Personalized Health Plan  CMS Preventative Services Quick Reference  Colon Cancer Screening  Dementia/Memory   Depression/Dysphoria  Diabetic Lab Screening   Fall Risk  Glaucoma Risk  Hearing Problem  Immunizations Discussed/Encouraged (specific immunizations; Influenza, Pneumococcal 23, Prevnar 20 (Pneumococcal 20-valent conjugate), Vaxneuvance (Pneumococcal 15-valent conjugate), Shingrix and COVID19 )  Inadequate Social Support, Isolation, Loneliness, Lack of Transportation, Financial Difficulties, or Caregiver Stress   Inactivity/Sedentary  Lung Cancer Risk  Prostate Cancer Screening   Tobacco Use/Dependance (use dotphrase .tobaccocessation for documentation)  Urinary Incontinence    The above risks/problems have been discussed with the patient.  Pertinent information has been shared with the patient in the After Visit Summary.  Follow up plans and orders are seen  below in the Assessment/Plan Section.    Diagnoses and all orders for this visit:    1. Medicare annual wellness visit, subsequent (Primary)    2. Other fatigue  -     Vitamin B12    3. Encounter for lipid screening for cardiovascular disease  -     Lipid Panel  -     Comprehensive Metabolic Panel    4. Encounter for screening for malignant neoplasm of prostate  -     PSA Screen    Other orders  -     albuterol sulfate  (90 Base) MCG/ACT inhaler; Inhale 2 puffs Every 6 (Six) Hours As Needed for Wheezing or Shortness of Air (coughing).  Dispense: 18 g; Refill: 0      Follow Up:  No follow-ups on file.     An After Visit Summary and PPPS were given to the patient.

## 2023-04-01 LAB
ALBUMIN SERPL-MCNC: 4.5 G/DL (ref 3.5–5.2)
ALBUMIN/GLOB SERPL: 1.7 G/DL
ALP SERPL-CCNC: 70 U/L (ref 39–117)
ALT SERPL W P-5'-P-CCNC: 24 U/L (ref 1–41)
ANION GAP SERPL CALCULATED.3IONS-SCNC: 10 MMOL/L (ref 5–15)
AST SERPL-CCNC: 28 U/L (ref 1–40)
BILIRUB SERPL-MCNC: 0.9 MG/DL (ref 0–1.2)
BUN SERPL-MCNC: 14 MG/DL (ref 8–23)
BUN/CREAT SERPL: 14.6 (ref 7–25)
CALCIUM SPEC-SCNC: 9 MG/DL (ref 8.6–10.5)
CHLORIDE SERPL-SCNC: 102 MMOL/L (ref 98–107)
CHOLEST SERPL-MCNC: 225 MG/DL (ref 0–200)
CO2 SERPL-SCNC: 27 MMOL/L (ref 22–29)
CREAT SERPL-MCNC: 0.96 MG/DL (ref 0.76–1.27)
EGFRCR SERPLBLD CKD-EPI 2021: 85 ML/MIN/1.73
GLOBULIN UR ELPH-MCNC: 2.7 GM/DL
GLUCOSE SERPL-MCNC: 85 MG/DL (ref 65–99)
HDLC SERPL-MCNC: 46 MG/DL (ref 40–60)
LDLC SERPL CALC-MCNC: 149 MG/DL (ref 0–100)
LDLC/HDLC SERPL: 3.18 {RATIO}
POTASSIUM SERPL-SCNC: 4.5 MMOL/L (ref 3.5–5.2)
PROT SERPL-MCNC: 7.2 G/DL (ref 6–8.5)
PSA SERPL-MCNC: 0.31 NG/ML (ref 0–4)
SODIUM SERPL-SCNC: 139 MMOL/L (ref 136–145)
TRIGL SERPL-MCNC: 164 MG/DL (ref 0–150)
VIT B12 BLD-MCNC: 840 PG/ML (ref 211–946)
VLDLC SERPL-MCNC: 30 MG/DL (ref 5–40)

## 2023-04-10 ENCOUNTER — TELEPHONE (OUTPATIENT)
Dept: FAMILY MEDICINE CLINIC | Facility: CLINIC | Age: 73
End: 2023-04-10
Payer: MEDICARE

## 2023-04-10 NOTE — TELEPHONE ENCOUNTER
HUB to read  HUB to ask question   My chart message was sent to the patient    LIPIDS high------will he try low dose statin (cholesterol medication)?   B12/ CMP good   PSA good

## 2023-04-10 NOTE — TELEPHONE ENCOUNTER
----- Message from Amber Stuart, DO sent at 4/9/2023  7:57 PM EDT -----  LIPIDS high------will he try low dose statin?  B12/ CMP good  PSA good

## 2023-04-12 ENCOUNTER — TELEPHONE (OUTPATIENT)
Dept: FAMILY MEDICINE CLINIC | Facility: CLINIC | Age: 73
End: 2023-04-12

## 2023-04-12 NOTE — TELEPHONE ENCOUNTER
HUB to read  HUB to ask question   My chart message was sent to the patient     LIPIDS high------will he try low dose statin (cholesterol medication)?   B12/ CMP good   PSA good     Attempted to call pt - mailbox not setup, unable to LVM

## 2023-04-12 NOTE — TELEPHONE ENCOUNTER
"Caller: Jayson Martin    Relationship: Self    Best call back number: [phone]    HUB RELAYED:    \"LIPIDS high------will he try low dose statin (cholesterol medication)?   B12/ CMP good   PSA good \"    PATIENT DOES NOT WANT TO START ANY MEDICATION. REQUESTING A CALL WITH OTHER OPTIONS HE CAN TRY TO HELP LOWER CHOLESTEROL;.  "

## 2023-10-16 RX ORDER — ALBUTEROL SULFATE 90 UG/1
2 AEROSOL, METERED RESPIRATORY (INHALATION) EVERY 4 HOURS PRN
Qty: 18 G | Refills: 0 | Status: SHIPPED | OUTPATIENT
Start: 2023-10-16

## 2023-12-05 RX ORDER — FLUTICASONE FUROATE, UMECLIDINIUM BROMIDE AND VILANTEROL TRIFENATATE 100; 62.5; 25 UG/1; UG/1; UG/1
1 POWDER RESPIRATORY (INHALATION)
Qty: 60 EACH | Refills: 3 | Status: SHIPPED | OUTPATIENT
Start: 2023-12-05

## 2023-12-05 RX ORDER — ALBUTEROL SULFATE 90 UG/1
2 AEROSOL, METERED RESPIRATORY (INHALATION) EVERY 4 HOURS PRN
Qty: 18 G | Refills: 0 | Status: SHIPPED | OUTPATIENT
Start: 2023-12-05

## 2023-12-05 NOTE — TELEPHONE ENCOUNTER
Patient asking if he can also get rx sent in for Trelegy.  He states that you had prescribed it to him previously and given him a sample.

## 2023-12-05 NOTE — TELEPHONE ENCOUNTER
Incoming Refill Request      Medication requested (name and dose): Trelegy (not on active med list)  Albuterol Inhaler    [facility] where [facility]    Additional details provided by patient: Patient requesting refill of Trelegy.  He states he was started on it back in March and is now out.  Not on his active med list.    Best call back number:     Does the patient have less than a 3 day supply:  [x] Yes  [] No    Cathleen Carlson, Shondaed Rep  12/05/23, 10:57 EST

## 2023-12-12 ENCOUNTER — OFFICE VISIT (OUTPATIENT)
Dept: FAMILY MEDICINE CLINIC | Facility: CLINIC | Age: 73
End: 2023-12-12
Payer: COMMERCIAL

## 2023-12-12 VITALS
HEIGHT: 69 IN | SYSTOLIC BLOOD PRESSURE: 125 MMHG | OXYGEN SATURATION: 95 % | BODY MASS INDEX: 24.73 KG/M2 | HEART RATE: 70 BPM | DIASTOLIC BLOOD PRESSURE: 71 MMHG | RESPIRATION RATE: 18 BRPM | TEMPERATURE: 98.6 F | WEIGHT: 167 LBS

## 2023-12-12 DIAGNOSIS — Z13.6 ENCOUNTER FOR LIPID SCREENING FOR CARDIOVASCULAR DISEASE: ICD-10-CM

## 2023-12-12 DIAGNOSIS — Z23 NEED FOR INFLUENZA VACCINATION: ICD-10-CM

## 2023-12-12 DIAGNOSIS — Z13.220 ENCOUNTER FOR LIPID SCREENING FOR CARDIOVASCULAR DISEASE: ICD-10-CM

## 2023-12-12 DIAGNOSIS — Z12.5 ENCOUNTER FOR SCREENING FOR MALIGNANT NEOPLASM OF PROSTATE: ICD-10-CM

## 2023-12-12 DIAGNOSIS — F17.211 CIGARETTE NICOTINE DEPENDENCE IN REMISSION: Primary | ICD-10-CM

## 2023-12-12 RX ORDER — FLUTICASONE FUROATE, UMECLIDINIUM BROMIDE AND VILANTEROL TRIFENATATE 100; 62.5; 25 UG/1; UG/1; UG/1
1 POWDER RESPIRATORY (INHALATION)
Qty: 60 EACH | Refills: 0 | Status: SHIPPED | OUTPATIENT
Start: 2023-12-12

## 2023-12-12 RX ORDER — LANOLIN ALCOHOL/MO/W.PET/CERES
1000 CREAM (GRAM) TOPICAL DAILY
COMMUNITY
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
## 2023-12-13 ENCOUNTER — TRANSCRIBE ORDERS (OUTPATIENT)
Dept: ADMINISTRATIVE | Facility: HOSPITAL | Age: 73
End: 2023-12-13
Payer: MEDICARE

## 2023-12-13 DIAGNOSIS — Z00.00 ROUTINE CHECK-UP: Primary | ICD-10-CM

## 2023-12-15 DIAGNOSIS — F17.211 CIGARETTE NICOTINE DEPENDENCE IN REMISSION: Primary | ICD-10-CM

## 2023-12-15 DIAGNOSIS — E78.2 MIXED HYPERLIPIDEMIA: ICD-10-CM

## 2023-12-15 DIAGNOSIS — R09.89 OTHER SPECIFIED SYMPTOMS AND SIGNS INVOLVING THE CIRCULATORY AND RESPIRATORY SYSTEMS: ICD-10-CM

## 2023-12-15 DIAGNOSIS — J43.1 PANLOBULAR EMPHYSEMA: ICD-10-CM

## 2023-12-18 DIAGNOSIS — F17.211 CIGARETTE NICOTINE DEPENDENCE IN REMISSION: Primary | ICD-10-CM

## 2023-12-18 DIAGNOSIS — E78.2 MIXED HYPERLIPIDEMIA: ICD-10-CM

## 2023-12-19 ENCOUNTER — HOSPITAL ENCOUNTER (OUTPATIENT)
Dept: CARDIOLOGY | Facility: HOSPITAL | Age: 73
Discharge: HOME | End: 2023-12-19
Admitting: FAMILY MEDICINE
Payer: COMMERCIAL

## 2023-12-19 DIAGNOSIS — Z00.00 ROUTINE CHECK-UP: ICD-10-CM

## 2023-12-19 PROCEDURE — 93799 UNLISTED CV SVC/PROCEDURE: CPT

## 2023-12-20 LAB
BH CV VAS SCREENING CAROTID CCA LEFT: 69.6 CM/SEC
BH CV VAS SCREENING CAROTID CCA RIGHT: 63.4 CM/SEC
BH CV VAS SCREENING CAROTID ICA LEFT: 70.2 CM/SEC
BH CV VAS SCREENING CAROTID ICA RIGHT: 59 CM/SEC
BH CV XLRA MEAS - MID AO DIAM: 2.42 CM
BH CV XLRA MEAS - PAD LEFT ABI PT: 1.13
BH CV XLRA MEAS - PAD LEFT ARM: 124 MMHG
BH CV XLRA MEAS - PAD LEFT LEG PT: 147 MMHG
BH CV XLRA MEAS - PAD RIGHT ABI PT: 1.15
BH CV XLRA MEAS - PAD RIGHT ARM: 130 MMHG
BH CV XLRA MEAS - PAD RIGHT LEG PT: 149 MMHG
BH CV XLRA MEAS LEFT ICA/CCA RATIO: 1.01
BH CV XLRA MEAS RIGHT ICA/CCA RATIO: 0.93

## 2023-12-21 ENCOUNTER — PATIENT MESSAGE (OUTPATIENT)
Dept: FAMILY MEDICINE CLINIC | Facility: CLINIC | Age: 73
End: 2023-12-21
Payer: MEDICARE

## 2023-12-26 ENCOUNTER — TELEPHONE (OUTPATIENT)
Dept: FAMILY MEDICINE CLINIC | Facility: CLINIC | Age: 73
End: 2023-12-26
Payer: MEDICARE

## 2023-12-26 NOTE — TELEPHONE ENCOUNTER
DELETE AFTER REVIEWING: Telephone encounter to be sent to the clinical pool    Name: Milton Martinfareed PENA    Relationship: Self    Best Callback Number: [phone]     HUB PROVIDED THE RELAY MESSAGE FROM THE OFFICE   PATIENT VOICED UNDERSTANDING AND HAS NO FURTHER QUESTIONS AT THIS TIME

## 2023-12-26 NOTE — TELEPHONE ENCOUNTER
RELAY    Amber Stuart DO  12/21/2023 12:46 PM EST        Mild blockage of neck vessels but legs and aorta are good  Can rech carotid US in 1 yr     Attempted to call pt - mailbox full

## 2024-01-11 ENCOUNTER — HOSPITAL ENCOUNTER (OUTPATIENT)
Dept: CT IMAGING | Facility: HOSPITAL | Age: 74
Discharge: HOME | End: 2024-01-11
Payer: COMMERCIAL

## 2024-01-11 DIAGNOSIS — F17.211 CIGARETTE NICOTINE DEPENDENCE IN REMISSION: ICD-10-CM

## 2024-01-11 DIAGNOSIS — E78.2 MIXED HYPERLIPIDEMIA: ICD-10-CM

## 2024-01-11 PROCEDURE — 71271 CT THORAX LUNG CANCER SCR C-: CPT

## 2024-01-11 PROCEDURE — 75571 CT HRT W/O DYE W/CA TEST: CPT

## 2024-01-15 RX ORDER — FLUTICASONE FUROATE, UMECLIDINIUM BROMIDE AND VILANTEROL TRIFENATATE 100; 62.5; 25 UG/1; UG/1; UG/1
1 POWDER RESPIRATORY (INHALATION)
Qty: 60 EACH | Refills: 0 | Status: SHIPPED | OUTPATIENT
Start: 2024-01-15 | End: 2024-01-15 | Stop reason: SDUPTHER

## 2024-01-15 RX ORDER — FLUTICASONE FUROATE, UMECLIDINIUM BROMIDE AND VILANTEROL TRIFENATATE 100; 62.5; 25 UG/1; UG/1; UG/1
1 POWDER RESPIRATORY (INHALATION)
Qty: 60 EACH | Refills: 0 | Status: SHIPPED | OUTPATIENT
Start: 2024-01-15

## 2024-01-15 NOTE — TELEPHONE ENCOUNTER
Incoming Refill Request      Medication requested (name and dose): TRELEGY ELLIPTA 100-62.5MCG    [facility] where [facility] be sent[facility]    Additional details provided by patient: N/A    Best call back number:     Does the patient have less than a 3 day supply:  [] Yes  [x] No    Casi He Rep  01/15/24, 10:57 EST

## 2024-01-16 DIAGNOSIS — R93.1 ABNORMAL SCREENING CARDIAC CT: ICD-10-CM

## 2024-01-16 DIAGNOSIS — F17.211 CIGARETTE NICOTINE DEPENDENCE IN REMISSION: ICD-10-CM

## 2024-01-16 DIAGNOSIS — J43.1 PANLOBULAR EMPHYSEMA: ICD-10-CM

## 2024-01-16 DIAGNOSIS — Z91.89 HIGH RISK OF CARDIAC EVENT: Primary | ICD-10-CM

## 2024-01-16 DIAGNOSIS — E78.2 MIXED HYPERLIPIDEMIA: ICD-10-CM

## 2024-01-26 ENCOUNTER — OFFICE VISIT (OUTPATIENT)
Dept: CARDIOLOGY | Facility: CLINIC | Age: 74
End: 2024-01-26
Payer: MEDICARE

## 2024-01-26 VITALS
BODY MASS INDEX: 26.36 KG/M2 | DIASTOLIC BLOOD PRESSURE: 82 MMHG | WEIGHT: 178 LBS | HEIGHT: 69 IN | HEART RATE: 71 BPM | RESPIRATION RATE: 18 BRPM | SYSTOLIC BLOOD PRESSURE: 136 MMHG

## 2024-01-26 DIAGNOSIS — G89.29 CHRONIC CHEST PAIN WITH HIGH RISK FOR CAD: Primary | ICD-10-CM

## 2024-01-26 DIAGNOSIS — R06.09 DOE (DYSPNEA ON EXERTION): ICD-10-CM

## 2024-01-26 DIAGNOSIS — Z91.89 CHRONIC CHEST PAIN WITH HIGH RISK FOR CAD: Primary | ICD-10-CM

## 2024-01-26 DIAGNOSIS — R07.9 CHRONIC CHEST PAIN WITH HIGH RISK FOR CAD: Primary | ICD-10-CM

## 2024-01-26 PROCEDURE — 93000 ELECTROCARDIOGRAM COMPLETE: CPT | Performed by: INTERNAL MEDICINE

## 2024-01-26 RX ORDER — ATORVASTATIN CALCIUM 20 MG/1
20 TABLET, FILM COATED ORAL DAILY
Qty: 30 TABLET | Refills: 11 | Status: SHIPPED | OUTPATIENT
Start: 2024-01-26

## 2024-01-26 RX ORDER — ASPIRIN 81 MG/1
81 TABLET ORAL DAILY
Qty: 90 TABLET | Refills: 3 | Status: SHIPPED | OUTPATIENT
Start: 2024-01-26
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
## 2024-01-29 ENCOUNTER — PATIENT ROUNDING (BHMG ONLY) (OUTPATIENT)
Dept: CARDIOLOGY | Facility: CLINIC | Age: 74
End: 2024-01-29
Payer: MEDICARE

## 2024-01-29 PROBLEM — Z91.89 CHRONIC CHEST PAIN WITH HIGH RISK FOR CAD: Status: ACTIVE | Noted: 2024-01-26

## 2024-01-29 PROBLEM — G89.29 CHRONIC CHEST PAIN WITH HIGH RISK FOR CAD: Status: ACTIVE | Noted: 2024-01-26

## 2024-01-29 PROBLEM — R07.9 CHRONIC CHEST PAIN WITH HIGH RISK FOR CAD: Status: ACTIVE | Noted: 2024-01-26

## 2024-01-29 NOTE — PROGRESS NOTES
A My-Chart message has been sent to the patient for PATIENT ROUNDING with [facility].

## 2024-01-31 ENCOUNTER — DOCUMENTATION (OUTPATIENT)
Dept: CARDIOLOGY | Facility: CLINIC | Age: 74
End: 2024-01-31
Payer: MEDICARE

## 2024-01-31 DIAGNOSIS — G89.29 CHRONIC CHEST PAIN WITH HIGH RISK FOR CAD: Primary | ICD-10-CM

## 2024-01-31 DIAGNOSIS — Z91.89 CHRONIC CHEST PAIN WITH HIGH RISK FOR CAD: Primary | ICD-10-CM

## 2024-01-31 DIAGNOSIS — R93.1 ELEVATED CORONARY ARTERY CALCIUM SCORE: ICD-10-CM

## 2024-01-31 DIAGNOSIS — R06.09 DOE (DYSPNEA ON EXERTION): ICD-10-CM

## 2024-01-31 DIAGNOSIS — R07.9 CHRONIC CHEST PAIN WITH HIGH RISK FOR CAD: Primary | ICD-10-CM

## 2024-01-31 NOTE — PROGRESS NOTES
Left heart catheterization coronary angiography diagnostic angiography was refused by insurance company and lieu of stress testing    Discussed extensively with the insurance provider with calcium score greater than 1300, high pretest probability for coronary artery disease, positive symptoms of dyspnea on exertion and anginal chest pain, radiographic evidence of coronary artery disease noting calcium score    With positive symptoms and after mentioned significant cardiac risk factors including hypertension, hyperlipidemia, family history of coronary disease, positive symptoms of chest pain, and radiographic evidence of coronary artery disease from his calcium scoring which was high risk for cardiac events over the next 5 years secondary to his symptoms if the stress test was unremarkable we would assume it is a false negative stress test and if it is abnormal would be indicative of ischemia both cases of which we would recommend invasive coronary angiography guided by IFR approach for physiologic assessment of any coronary stenosis definitively for this patient    Despite this, insurance provider denied.  Will discuss with the patient that if he has any further chest pain go to immediately to the emergency department for urgent evaluation.  Patient has poor exertional capacity unclear whether he can achieve 5 METs by treadmill, advanced COPD with long-term smoking history unclear whether he can receive stress agent safely at this time

## 2024-02-14 ENCOUNTER — HOSPITAL ENCOUNTER (OUTPATIENT)
Dept: NUCLEAR MEDICINE | Facility: HOSPITAL | Age: 74
Discharge: HOME | End: 2024-02-14
Payer: COMMERCIAL

## 2024-02-14 DIAGNOSIS — R06.09 DOE (DYSPNEA ON EXERTION): ICD-10-CM

## 2024-02-14 DIAGNOSIS — R07.9 CHRONIC CHEST PAIN WITH HIGH RISK FOR CAD: ICD-10-CM

## 2024-02-14 DIAGNOSIS — R93.1 ELEVATED CORONARY ARTERY CALCIUM SCORE: ICD-10-CM

## 2024-02-14 DIAGNOSIS — Z91.89 CHRONIC CHEST PAIN WITH HIGH RISK FOR CAD: ICD-10-CM

## 2024-02-14 DIAGNOSIS — G89.29 CHRONIC CHEST PAIN WITH HIGH RISK FOR CAD: ICD-10-CM

## 2024-02-14 PROCEDURE — 93017 CV STRESS TEST TRACING ONLY: CPT

## 2024-02-14 PROCEDURE — A9502 TC99M TETROFOSMIN: HCPCS | Performed by: INTERNAL MEDICINE

## 2024-02-14 PROCEDURE — 78452 HT MUSCLE IMAGE SPECT MULT: CPT

## 2024-02-14 PROCEDURE — 0 TECHNETIUM TETROFOSMIN KIT: Performed by: INTERNAL MEDICINE

## 2024-02-14 RX ADMIN — TETROFOSMIN 1 DOSE: 1.38 INJECTION, POWDER, LYOPHILIZED, FOR SOLUTION INTRAVENOUS at 11:57

## 2024-02-14 RX ADMIN — TETROFOSMIN 1 DOSE: 1.38 INJECTION, POWDER, LYOPHILIZED, FOR SOLUTION INTRAVENOUS at 11:13

## 2024-02-16 LAB
BH CV REST NUCLEAR ISOTOPE DOSE: 10.1 MCI
BH CV STRESS BP STAGE 1: NORMAL
BH CV STRESS BP STAGE 2: NORMAL
BH CV STRESS DURATION MIN STAGE 1: 3
BH CV STRESS DURATION MIN STAGE 2: 3
BH CV STRESS DURATION SEC STAGE 1: 0
BH CV STRESS DURATION SEC STAGE 2: 0
BH CV STRESS GRADE STAGE 1: 10
BH CV STRESS GRADE STAGE 2: 12
BH CV STRESS HR STAGE 1: 118
BH CV STRESS HR STAGE 2: 128
BH CV STRESS METS STAGE 1: 5
BH CV STRESS METS STAGE 2: 7.5
BH CV STRESS NUCLEAR ISOTOPE DOSE: 31.7 MCI
BH CV STRESS PROTOCOL 1: NORMAL
BH CV STRESS RECOVERY BP: NORMAL MMHG
BH CV STRESS RECOVERY HR: 77 BPM
BH CV STRESS SPEED STAGE 1: 1.7
BH CV STRESS SPEED STAGE 2: 2.5
BH CV STRESS STAGE 1: 1
BH CV STRESS STAGE 2: 2
LV EF NUC BP: 60 %
MAXIMAL PREDICTED HEART RATE: 149 BPM
PERCENT MAX PREDICTED HR: 85.91 %
STRESS BASELINE BP: NORMAL MMHG
STRESS BASELINE HR: 65 BPM
STRESS PERCENT HR: 101 %
STRESS POST ESTIMATED WORKLOAD: 6.4 METS
STRESS POST EXERCISE DUR MIN: 6 MIN
STRESS POST PEAK BP: NORMAL MMHG
STRESS POST PEAK HR: 128 BPM
STRESS TARGET HR: 127 BPM

## 2024-02-16 RX ORDER — FLUTICASONE FUROATE, UMECLIDINIUM BROMIDE AND VILANTEROL TRIFENATATE 100; 62.5; 25 UG/1; UG/1; UG/1
1 POWDER RESPIRATORY (INHALATION) DAILY
Qty: 60 EACH | Refills: 0 | Status: SHIPPED | OUTPATIENT
Start: 2024-02-16

## 2024-03-07 ENCOUNTER — OFFICE VISIT (OUTPATIENT)
Dept: CARDIOLOGY | Facility: CLINIC | Age: 74
End: 2024-03-07
Payer: MEDICARE

## 2024-03-07 VITALS
WEIGHT: 185 LBS | RESPIRATION RATE: 18 BRPM | SYSTOLIC BLOOD PRESSURE: 141 MMHG | HEIGHT: 69 IN | DIASTOLIC BLOOD PRESSURE: 54 MMHG | BODY MASS INDEX: 27.4 KG/M2 | HEART RATE: 79 BPM

## 2024-03-07 DIAGNOSIS — R06.09 DOE (DYSPNEA ON EXERTION): ICD-10-CM

## 2024-03-07 DIAGNOSIS — Z00.00 ROUTINE CHECK-UP: ICD-10-CM

## 2024-03-07 DIAGNOSIS — G89.29 CHRONIC CHEST PAIN WITH HIGH RISK FOR CAD: Primary | ICD-10-CM

## 2024-03-07 DIAGNOSIS — Z91.89 CHRONIC CHEST PAIN WITH HIGH RISK FOR CAD: Primary | ICD-10-CM

## 2024-03-07 DIAGNOSIS — R93.1 ELEVATED CORONARY ARTERY CALCIUM SCORE: ICD-10-CM

## 2024-03-07 DIAGNOSIS — R07.9 CHRONIC CHEST PAIN WITH HIGH RISK FOR CAD: Primary | ICD-10-CM

## 2024-03-07 RX ORDER — EZETIMIBE 10 MG/1
10 TABLET ORAL DAILY
Qty: 30 TABLET | Refills: 5 | Status: SHIPPED | OUTPATIENT
Start: 2024-03-07 | End: 2024-03-07

## 2024-03-07 RX ORDER — ATORVASTATIN CALCIUM 40 MG/1
40 TABLET, FILM COATED ORAL DAILY
Qty: 30 TABLET | Refills: 5 | Status: SHIPPED | OUTPATIENT
Start: 2024-03-07 | End: 2024-03-07

## 2024-03-07 RX ORDER — ATORVASTATIN CALCIUM 40 MG/1
40 TABLET, FILM COATED ORAL DAILY
Qty: 90 TABLET | Refills: 1 | Status: SHIPPED | OUTPATIENT
Start: 2024-03-07

## 2024-03-07 RX ORDER — EZETIMIBE 10 MG/1
10 TABLET ORAL DAILY
Qty: 90 TABLET | Refills: 1 | Status: SHIPPED | OUTPATIENT
Start: 2024-03-07

## 2024-03-15 RX ORDER — FLUTICASONE FUROATE, UMECLIDINIUM BROMIDE AND VILANTEROL TRIFENATATE 100; 62.5; 25 UG/1; UG/1; UG/1
1 POWDER RESPIRATORY (INHALATION) DAILY
Qty: 60 EACH | Refills: 2 | Status: SHIPPED | OUTPATIENT
Start: 2024-03-15
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
## 2024-04-15 ENCOUNTER — TELEPHONE (OUTPATIENT)
Dept: FAMILY MEDICINE CLINIC | Facility: CLINIC | Age: 74
End: 2024-04-15

## 2024-04-15 ENCOUNTER — CLINICAL SUPPORT (OUTPATIENT)
Dept: FAMILY MEDICINE CLINIC | Facility: CLINIC | Age: 74
End: 2024-04-15
Payer: COMMERCIAL

## 2024-04-15 DIAGNOSIS — Z13.6 ENCOUNTER FOR LIPID SCREENING FOR CARDIOVASCULAR DISEASE: ICD-10-CM

## 2024-04-15 DIAGNOSIS — Z12.5 ENCOUNTER FOR SCREENING FOR MALIGNANT NEOPLASM OF PROSTATE: ICD-10-CM

## 2024-04-15 DIAGNOSIS — Z13.220 ENCOUNTER FOR LIPID SCREENING FOR CARDIOVASCULAR DISEASE: ICD-10-CM

## 2024-04-15 LAB
ALBUMIN SERPL-MCNC: 4.2 G/DL (ref 3.5–5.2)
ALBUMIN/GLOB SERPL: 1.7 G/DL
ALP SERPL-CCNC: 68 U/L (ref 39–117)
ALT SERPL W P-5'-P-CCNC: 36 U/L (ref 1–41)
ANION GAP SERPL CALCULATED.3IONS-SCNC: 10 MMOL/L (ref 5–15)
AST SERPL-CCNC: 31 U/L (ref 1–40)
BILIRUB SERPL-MCNC: 1.2 MG/DL (ref 0–1.2)
BUN SERPL-MCNC: 12 MG/DL (ref 8–23)
BUN/CREAT SERPL: 12.1 (ref 7–25)
CALCIUM SPEC-SCNC: 9.1 MG/DL (ref 8.6–10.5)
CHLORIDE SERPL-SCNC: 104 MMOL/L (ref 98–107)
CHOLEST SERPL-MCNC: 101 MG/DL (ref 0–200)
CO2 SERPL-SCNC: 27 MMOL/L (ref 22–29)
CREAT SERPL-MCNC: 0.99 MG/DL (ref 0.76–1.27)
EGFRCR SERPLBLD CKD-EPI 2021: 81.4 ML/MIN/1.73
GLOBULIN UR ELPH-MCNC: 2.5 GM/DL
GLUCOSE SERPL-MCNC: 93 MG/DL (ref 65–99)
HDLC SERPL-MCNC: 37 MG/DL (ref 40–60)
LDLC SERPL CALC-MCNC: 45 MG/DL (ref 0–100)
LDLC/HDLC SERPL: 1.21 {RATIO}
POTASSIUM SERPL-SCNC: 4.1 MMOL/L (ref 3.5–5.2)
PROT SERPL-MCNC: 6.7 G/DL (ref 6–8.5)
PSA SERPL-MCNC: 0.36 NG/ML (ref 0–4)
SODIUM SERPL-SCNC: 141 MMOL/L (ref 136–145)
TRIGL SERPL-MCNC: 97 MG/DL (ref 0–150)
VLDLC SERPL-MCNC: 19 MG/DL (ref 5–40)

## 2024-04-15 PROCEDURE — 80061 LIPID PANEL: CPT | Performed by: FAMILY MEDICINE

## 2024-04-15 PROCEDURE — G0103 PSA SCREENING: HCPCS | Performed by: FAMILY MEDICINE

## 2024-04-15 PROCEDURE — 36415 COLL VENOUS BLD VENIPUNCTURE: CPT | Performed by: FAMILY MEDICINE

## 2024-04-15 PROCEDURE — 80053 COMPREHEN METABOLIC PANEL: CPT | Performed by: FAMILY MEDICINE

## 2024-04-15 RX ORDER — ALBUTEROL SULFATE 90 UG/1
2 AEROSOL, METERED RESPIRATORY (INHALATION) EVERY 4 HOURS PRN
Qty: 18 G | Refills: 0 | Status: SHIPPED | OUTPATIENT
Start: 2024-04-15

## 2024-04-15 NOTE — TELEPHONE ENCOUNTER
"Pt came in for labs today and wanted to ask if he was still supposed to use the Trelegy inhaler, since his recent Dx was Emphysema and not \"COPD.\" I tried to explain that COPD is an umbrella term for lung issues, which emphysema falls under, but he just wanted to verify he should still use it daily.  "

## 2024-04-15 NOTE — PROGRESS NOTES
Venipuncture performed in L ARM by RT Aaron  with good hemostasis. Patient tolerated well. 04/15/24 Celeste Kerr, RT

## 2024-04-17 ENCOUNTER — PATIENT MESSAGE (OUTPATIENT)
Dept: FAMILY MEDICINE CLINIC | Facility: CLINIC | Age: 74
End: 2024-04-17
Payer: COMMERCIAL

## 2024-04-18 NOTE — TELEPHONE ENCOUNTER
RELAY    Yes, he should use Trelegy every day if covered by his ins. - Dr Sade Stuart, DO  4/16/2024  9:39 PM EDT        LIPIDS/ PSA/ CMP---good     Attempted to call pt - mailbox not setup  [facility] msg also sent

## 2024-06-17 RX ORDER — FLUTICASONE FUROATE, UMECLIDINIUM BROMIDE AND VILANTEROL TRIFENATATE 100; 62.5; 25 UG/1; UG/1; UG/1
1 POWDER RESPIRATORY (INHALATION) DAILY
Qty: 60 EACH | Refills: 2 | Status: SHIPPED | OUTPATIENT
Start: 2024-06-17

## 2024-08-07 RX ORDER — ALBUTEROL SULFATE 90 UG/1
2 AEROSOL, METERED RESPIRATORY (INHALATION) EVERY 4 HOURS PRN
Qty: 18 G | Refills: 1 | Status: SHIPPED | OUTPATIENT
Start: 2024-08-07

## 2024-09-13 RX ORDER — FLUTICASONE FUROATE, UMECLIDINIUM BROMIDE AND VILANTEROL TRIFENATATE 100; 62.5; 25 UG/1; UG/1; UG/1
1 POWDER RESPIRATORY (INHALATION) DAILY
Qty: 60 EACH | Refills: 2 | Status: SHIPPED | OUTPATIENT
Start: 2024-09-13

## 2024-11-14 RX ORDER — FLUTICASONE FUROATE, UMECLIDINIUM BROMIDE AND VILANTEROL TRIFENATATE 100; 62.5; 25 UG/1; UG/1; UG/1
1 POWDER RESPIRATORY (INHALATION) DAILY
Qty: 60 EACH | Refills: 2 | Status: SHIPPED | OUTPATIENT
Start: 2024-11-14

## 2024-11-21 DIAGNOSIS — Z12.11 COLON CANCER SCREENING: Primary | ICD-10-CM

## 2024-12-09 ENCOUNTER — PATIENT MESSAGE (OUTPATIENT)
Dept: FAMILY MEDICINE CLINIC | Facility: CLINIC | Age: 74
End: 2024-12-09
Payer: COMMERCIAL

## 2024-12-11 ENCOUNTER — PATIENT OUTREACH (OUTPATIENT)
Dept: FAMILY MEDICINE CLINIC | Facility: CLINIC | Age: 74
End: 2024-12-11
Payer: COMMERCIAL

## 2024-12-11 NOTE — OUTREACH NOTE
"Left voice message stating patient is due for an annual wellness visit- separate appt than normal follow ups    Relay     \"Please schedule for a SUB medicare wellness\"    " CHIEF COMPLAINT: PATIENT PRESENTS FOR A COMPREHENSIVE MEDICAL OCULAR EVALUATION       Occupation: student     Assessment:  Myopia and Astigmatism     Plan: Per Final Rx in the Smart Form and New contact lens Rx,     Contact lenses fit well,cornea healthy, patient comfortable, ok to renew Contacts prescription    PATIENT WILL CALL WHEN DESIRES NEW CONTACT LENS ORDER      Assessment: MISSING TEAR DUCT LL  OU    Plan:  DISCUSSED      UNDILATED AT REQUEST, DISCUSSED LIMITED PERIPHERAL VIEW ,AND IMPORTANCE OF A DILATED EXAM    DIGITAL RETINAL PHOTO 11/9/2023, EDUCATIONAL

## 2024-12-20 ENCOUNTER — OFFICE VISIT (OUTPATIENT)
Dept: CARDIOLOGY | Facility: CLINIC | Age: 74
End: 2024-12-20
Payer: MEDICARE

## 2024-12-20 VITALS
SYSTOLIC BLOOD PRESSURE: 142 MMHG | DIASTOLIC BLOOD PRESSURE: 84 MMHG | BODY MASS INDEX: 27.7 KG/M2 | HEART RATE: 76 BPM | WEIGHT: 187 LBS | OXYGEN SATURATION: 92 % | HEIGHT: 69 IN | RESPIRATION RATE: 18 BRPM

## 2024-12-20 DIAGNOSIS — Z91.89 CHRONIC CHEST PAIN WITH HIGH RISK FOR CAD: Primary | ICD-10-CM

## 2024-12-20 DIAGNOSIS — R93.1 ELEVATED CORONARY ARTERY CALCIUM SCORE: ICD-10-CM

## 2024-12-20 DIAGNOSIS — R06.09 DOE (DYSPNEA ON EXERTION): ICD-10-CM

## 2024-12-20 DIAGNOSIS — G89.29 CHRONIC CHEST PAIN WITH HIGH RISK FOR CAD: Primary | ICD-10-CM

## 2024-12-20 DIAGNOSIS — R07.9 CHRONIC CHEST PAIN WITH HIGH RISK FOR CAD: Primary | ICD-10-CM
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
## 2025-02-04 RX ORDER — FLUTICASONE FUROATE, UMECLIDINIUM BROMIDE AND VILANTEROL TRIFENATATE 100; 62.5; 25 UG/1; UG/1; UG/1
1 POWDER RESPIRATORY (INHALATION) DAILY
Qty: 60 EACH | Refills: 2 | OUTPATIENT
Start: 2025-02-04

## 2025-02-04 NOTE — TELEPHONE ENCOUNTER
Refused by: Amber Stuart DO     Refusal reason: Patient needs an appointment     
RELAY.    PHONE NEVER RUNG OUT AND THEN HUNG UP, PATIENT DUE FOR AN OFFICE VISIT WITH DR LAMB FOR MED REFILL.   
Rx Refill Note  Requested Prescriptions     Pending Prescriptions Disp Refills    Trelegy Ellipta 100-62.5-25 MCG/ACT inhaler [Pharmacy Med Name: TRELEGY ELLIPTA 100-62.5MCG INH 30P] 60 each 2     Sig: INHALE 1 PUFF BY MOUTH DAILY      Last office visit with prescribing clinician: 12/12/2023   Last telemedicine visit with prescribing clinician: Visit date not found   Next office visit with prescribing clinician: Visit date not found        Lipid Panel (04/15/2024 08:27)                  Would you like a call back once the refill request has been completed: [] Yes [] No    If the office needs to give you a call back, can they leave a voicemail: [] Yes [] No    Celeste Kerr, RT  02/04/25, 10:46 EST  
None

## 2025-02-07 RX ORDER — ASPIRIN 81 MG/1
81 TABLET, COATED ORAL DAILY
Qty: 90 TABLET | Refills: 3 | Status: SHIPPED | OUTPATIENT
Start: 2025-02-07

## 2025-02-10 ENCOUNTER — OFFICE VISIT (OUTPATIENT)
Dept: FAMILY MEDICINE CLINIC | Facility: CLINIC | Age: 75
End: 2025-02-10
Payer: COMMERCIAL

## 2025-02-10 VITALS
RESPIRATION RATE: 18 BRPM | BODY MASS INDEX: 27.7 KG/M2 | DIASTOLIC BLOOD PRESSURE: 69 MMHG | HEIGHT: 69 IN | HEART RATE: 76 BPM | TEMPERATURE: 98.4 F | WEIGHT: 187 LBS | SYSTOLIC BLOOD PRESSURE: 118 MMHG | OXYGEN SATURATION: 94 %

## 2025-02-10 DIAGNOSIS — J43.1 PANLOBULAR EMPHYSEMA: Primary | ICD-10-CM

## 2025-02-10 DIAGNOSIS — Z13.220 ENCOUNTER FOR LIPID SCREENING FOR CARDIOVASCULAR DISEASE: ICD-10-CM

## 2025-02-10 DIAGNOSIS — Z12.5 ENCOUNTER FOR SCREENING FOR MALIGNANT NEOPLASM OF PROSTATE: ICD-10-CM

## 2025-02-10 DIAGNOSIS — E78.2 MIXED HYPERLIPIDEMIA: ICD-10-CM

## 2025-02-10 DIAGNOSIS — Z23 IMMUNIZATION DUE: ICD-10-CM

## 2025-02-10 DIAGNOSIS — Z13.6 ENCOUNTER FOR LIPID SCREENING FOR CARDIOVASCULAR DISEASE: ICD-10-CM

## 2025-02-10 RX ORDER — RESPIRATORY SYNCYTIAL VISUS VACCINE RECOMBINANT, ADJUVANTED 120MCG/0.5
0.5 KIT INTRAMUSCULAR ONCE
Qty: 1 EACH | Refills: 0 | Status: SHIPPED | OUTPATIENT
Start: 2025-02-10 | End: 2025-02-10

## 2025-02-10 RX ORDER — FLUTICASONE FUROATE, UMECLIDINIUM BROMIDE AND VILANTEROL TRIFENATATE 100; 62.5; 25 UG/1; UG/1; UG/1
1 POWDER RESPIRATORY (INHALATION) DAILY
Qty: 60 EACH | Refills: 11 | Status: SHIPPED | OUTPATIENT
Start: 2025-02-10

## 2025-02-10 RX ORDER — ATORVASTATIN CALCIUM 40 MG/1
40 TABLET, FILM COATED ORAL DAILY
Qty: 90 TABLET | Refills: 1 | Status: SHIPPED | OUTPATIENT
Start: 2025-02-10
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
## 2025-02-23 NOTE — ADDENDUM NOTE
Addended by: NAZIA MURILLO on: 1/26/2024 01:37 PM     Modules accepted: Orders    
[email or web address]

## 2025-03-11 ENCOUNTER — TELEPHONE (OUTPATIENT)
Dept: FAMILY MEDICINE CLINIC | Facility: CLINIC | Age: 75
End: 2025-03-11

## 2025-03-11 NOTE — TELEPHONE ENCOUNTER
Caller: Jayson Martin    Relationship to patient: Self    Best call back number: [phone] OK TO LEAVE MESSAGE    Chief complaint: FREEZE MOLE ON CHEEK    Type of visit: IN OFFICE PROCEDURE     Requested date: 5/5 AROUND 8:30 (WHEN HE IS DONE WITH LABS)     If rescheduling, when is the original appointment: PATIENT WANTS TO COME THE SAME DAY HE HAS LABS 5/5     Additional notes:PLEASE CALL. HE SAID IT WAS OK TO LEAVE A MESSAGE WITH APPT. APPROVAL OR IF IT CAN'T BE DONE THAT DAY.

## 2025-03-12 RX ORDER — EZETIMIBE 10 MG/1
10 TABLET ORAL DAILY
Qty: 30 TABLET | Refills: 5 | Status: SHIPPED | OUTPATIENT
Start: 2025-03-12

## 2025-03-31 RX ORDER — ATORVASTATIN CALCIUM 40 MG/1
40 TABLET, FILM COATED ORAL DAILY
Qty: 90 TABLET | Refills: 1 | Status: SHIPPED | OUTPATIENT
Start: 2025-03-31

## 2025-04-02 ENCOUNTER — TELEPHONE (OUTPATIENT)
Dept: CARDIOLOGY | Facility: CLINIC | Age: 75
End: 2025-04-02

## 2025-04-02 NOTE — TELEPHONE ENCOUNTER
Caller: Jayson Martin    Relationship: Self    Best call back number: [phone]     What is the best time to reach you: ANYTIME    Who are you requesting to speak with (clinical staff, provider,  specific staff member): CLINICAL        What was the call regarding: PT NORMALLY TAKES ATORVASTATIN 20 MG 1 TABLET DAILY.  HE PICKED UP HIS RX AND IT WAS ATORVASTATIN 40 MG 1 TABLET DAILY.   WHAT DOSE SHOULD HE BE TAKING?     HIS PCP TOOK HIM OFF OF EZETIMIBE 10 MG IN JANUARY    PLEASE CALL TO DISCUSS

## 2025-04-16 ENCOUNTER — PATIENT OUTREACH (OUTPATIENT)
Dept: FAMILY MEDICINE CLINIC | Facility: CLINIC | Age: 75
End: 2025-04-16
Payer: COMMERCIAL

## 2025-05-05 ENCOUNTER — OFFICE VISIT (OUTPATIENT)
Dept: FAMILY MEDICINE CLINIC | Facility: CLINIC | Age: 75
End: 2025-05-05
Payer: COMMERCIAL

## 2025-05-05 VITALS
RESPIRATION RATE: 20 BRPM | DIASTOLIC BLOOD PRESSURE: 76 MMHG | SYSTOLIC BLOOD PRESSURE: 120 MMHG | HEIGHT: 67 IN | BODY MASS INDEX: 28.97 KG/M2 | TEMPERATURE: 98.6 F | WEIGHT: 184.6 LBS | HEART RATE: 74 BPM | OXYGEN SATURATION: 95 %

## 2025-05-05 DIAGNOSIS — Z11.59 ENCOUNTER FOR HEPATITIS C SCREENING TEST FOR LOW RISK PATIENT: ICD-10-CM

## 2025-05-05 DIAGNOSIS — Z13.220 ENCOUNTER FOR LIPID SCREENING FOR CARDIOVASCULAR DISEASE: ICD-10-CM

## 2025-05-05 DIAGNOSIS — Z12.5 ENCOUNTER FOR SCREENING FOR MALIGNANT NEOPLASM OF PROSTATE: ICD-10-CM

## 2025-05-05 DIAGNOSIS — E78.2 MIXED HYPERLIPIDEMIA: ICD-10-CM

## 2025-05-05 DIAGNOSIS — L98.9 SKIN LESION OF FACE: Primary | ICD-10-CM

## 2025-05-05 DIAGNOSIS — Z13.6 ENCOUNTER FOR LIPID SCREENING FOR CARDIOVASCULAR DISEASE: ICD-10-CM

## 2025-05-05 PROCEDURE — 86803 HEPATITIS C AB TEST: CPT | Performed by: FAMILY MEDICINE

## 2025-05-05 PROCEDURE — 80061 LIPID PANEL: CPT | Performed by: FAMILY MEDICINE

## 2025-05-05 PROCEDURE — G0103 PSA SCREENING: HCPCS | Performed by: FAMILY MEDICINE

## 2025-05-05 PROCEDURE — 80053 COMPREHEN METABOLIC PANEL: CPT | Performed by: FAMILY MEDICINE

## 2025-05-05 PROCEDURE — 88305 TISSUE EXAM BY PATHOLOGIST: CPT | Performed by: FAMILY MEDICINE

## 2025-05-05 NOTE — PROGRESS NOTES
Venipuncture performed in L ARM by RT Aaron  with good hemostasis. Patient tolerated well. 05/05/25 Celeste Kerr, RT
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
## 2025-05-06 LAB
ALBUMIN SERPL-MCNC: 4.5 G/DL (ref 3.5–5.2)
ALBUMIN/GLOB SERPL: 1.6 G/DL
ALP SERPL-CCNC: 83 U/L (ref 39–117)
ALT SERPL W P-5'-P-CCNC: 20 U/L (ref 1–41)
ANION GAP SERPL CALCULATED.3IONS-SCNC: 9 MMOL/L (ref 5–15)
AST SERPL-CCNC: 26 U/L (ref 1–40)
BILIRUB SERPL-MCNC: 0.8 MG/DL (ref 0–1.2)
BUN SERPL-MCNC: 11 MG/DL (ref 8–23)
BUN/CREAT SERPL: 10.9 (ref 7–25)
CALCIUM SPEC-SCNC: 9.3 MG/DL (ref 8.6–10.5)
CHLORIDE SERPL-SCNC: 104 MMOL/L (ref 98–107)
CHOLEST SERPL-MCNC: 141 MG/DL (ref 0–200)
CO2 SERPL-SCNC: 28 MMOL/L (ref 22–29)
CREAT SERPL-MCNC: 1.01 MG/DL (ref 0.76–1.27)
EGFRCR SERPLBLD CKD-EPI 2021: 79 ML/MIN/1.73
GLOBULIN UR ELPH-MCNC: 2.8 GM/DL
GLUCOSE SERPL-MCNC: 96 MG/DL (ref 65–99)
HCV AB SER QL: NORMAL
HDLC SERPL-MCNC: 39 MG/DL (ref 40–60)
LDLC SERPL CALC-MCNC: 79 MG/DL (ref 0–100)
LDLC/HDLC SERPL: 1.96 {RATIO}
POTASSIUM SERPL-SCNC: 5.5 MMOL/L (ref 3.5–5.2)
PROT SERPL-MCNC: 7.3 G/DL (ref 6–8.5)
PSA SERPL-MCNC: 0.41 NG/ML (ref 0–4)
SODIUM SERPL-SCNC: 141 MMOL/L (ref 136–145)
TRIGL SERPL-MCNC: 128 MG/DL (ref 0–150)
VLDLC SERPL-MCNC: 23 MG/DL (ref 5–40)

## 2025-05-07 LAB
LAB AP CASE REPORT: NORMAL
PATH REPORT.FINAL DX SPEC: NORMAL
PATH REPORT.GROSS SPEC: NORMAL

## 2025-07-01 RX ORDER — ALBUTEROL SULFATE 90 UG/1
2 INHALANT RESPIRATORY (INHALATION) EVERY 4 HOURS PRN
Qty: 18 G | Refills: 1 | Status: SHIPPED | OUTPATIENT
Start: 2025-07-01

## 2025-07-15 ENCOUNTER — PATIENT OUTREACH (OUTPATIENT)
Dept: FAMILY MEDICINE CLINIC | Facility: CLINIC | Age: 75
End: 2025-07-15
Payer: COMMERCIAL

## 2025-07-15 NOTE — OUTREACH NOTE
"Spoke with Wife Mary stating patient is due for an annual wellness visit. She stated that now was not a good time and advised to call back after Monday.    Relay     \"Please schedule for a SUB medicare wellness\"    "

## 2025-08-08 RX ORDER — FLUTICASONE FUROATE, UMECLIDINIUM BROMIDE AND VILANTEROL TRIFENATATE 100; 62.5; 25 UG/1; UG/1; UG/1
1 POWDER RESPIRATORY (INHALATION) DAILY
Qty: 60 EACH | Refills: 3 | Status: SHIPPED | OUTPATIENT
Start: 2025-08-08

## (undated) DEVICE — SUT VIC 0 CT1 CR8 18IN J840D

## (undated) DEVICE — DECANTER: Brand: UNBRANDED

## (undated) DEVICE — GLV SURG TRIUMPH ORTHO W/ALOE PF LTX 8 STRL

## (undated) DEVICE — TOWEL,OR,DSP,ST,WHITE,DLX,4/PK,20PK/CS: Brand: MEDLINE

## (undated) DEVICE — GLV SURG TRIUMPH LT PF LTX 8.5 STRL

## (undated) DEVICE — GLV SURG TRIUMPH GREEN W/ALOE PF LTX 8.5 STRL

## (undated) DEVICE — SOL IRRIG NACL 9PCT 1000ML BTL

## (undated) DEVICE — GLV SURG TRIUMPH GREEN W/ALOE PF LTX 8 STRL

## (undated) DEVICE — DRSNG SURESITE WNDW 4X4.5

## (undated) DEVICE — DUAL CUT SAGITTAL BLADE

## (undated) DEVICE — MARKR SKIN W/RULR

## (undated) DEVICE — PK PROC TURNOVER

## (undated) DEVICE — SOL IRRIG H2O 1000ML STRL

## (undated) DEVICE — PK TOTL SHLDR 50

## (undated) DEVICE — DRSNG BARR INSUL SHLDR POLAR/CARE S/ADHS XL

## (undated) DEVICE — NDL SUT CATGUT 1/2CIR TPR SZ6 1824-6D PK/2

## (undated) DEVICE — 3M(TM) TEGADERM(TM) IV TRANSPARENT FILM DRESSING WITH BORDER 1655: Brand: 3M™ TEGADERM™

## (undated) DEVICE — SUT VIC 2/0 CT2 27IN J269H

## (undated) DEVICE — CUFF SCD HEMOFORCE SEQ CALF STD MD

## (undated) DEVICE — SYS COLD/THRP POLAR/CARE/CUBE

## (undated) DEVICE — PAD COLD/THRP SHLDR POLAR/CARE WRAP/ON UNIV XL

## (undated) DEVICE — T-MAX DISPOSABLE FACE MASK 8 PER BOX

## (undated) DEVICE — SKIN AFFIX SURG ADHESIVE 72/CS 0.55ML: Brand: MEDLINE

## (undated) DEVICE — SOL IRRIG SOD CHL 0.9PCT 3000ML

## (undated) DEVICE — DRSNG TELFA PAD NONADH STR 1S 3X8IN